# Patient Record
Sex: MALE | Race: WHITE | NOT HISPANIC OR LATINO | Employment: OTHER | URBAN - METROPOLITAN AREA
[De-identification: names, ages, dates, MRNs, and addresses within clinical notes are randomized per-mention and may not be internally consistent; named-entity substitution may affect disease eponyms.]

---

## 2017-03-15 ENCOUNTER — HISTORICAL (OUTPATIENT)
Dept: LAB | Facility: HOSPITAL | Age: 67
End: 2017-03-15

## 2017-03-15 LAB
ABS NEUT (OLG): 1.7
ALBUMIN SERPL-MCNC: 3.8 GM/DL (ref 3.4–5)
ALBUMIN/GLOB SERPL: 1 RATIO (ref 1.1–2)
ALP SERPL-CCNC: 95 UNIT/L (ref 50–136)
ALT SERPL-CCNC: 29 UNIT/L (ref 12–78)
APTT PPP: 24.4 SECOND(S) (ref 23–32)
AST SERPL-CCNC: 20 UNIT/L (ref 10–37)
BILIRUB SERPL-MCNC: 0.5 MG/DL (ref 0.2–1)
BILIRUBIN DIRECT+TOT PNL SERPL-MCNC: 0.15 MG/DL (ref 0.05–0.2)
BILIRUBIN DIRECT+TOT PNL SERPL-MCNC: 0.35 MG/DL
BUN SERPL-MCNC: 15 MG/DL (ref 7–18)
CALCIUM SERPL-MCNC: 9.2 MG/DL (ref 8.5–10.1)
CHLORIDE SERPL-SCNC: 104 MMOL/L (ref 98–107)
CO2 SERPL-SCNC: 34.1 MMOL/L (ref 21–32)
CREAT SERPL-MCNC: 0.7 MG/DL (ref 0.7–1.3)
EOSINOPHIL NFR BLD MANUAL: 8 % (ref 0–8)
ERYTHROCYTE [DISTWIDTH] IN BLOOD BY AUTOMATED COUNT: 12 %
GLOBULIN SER-MCNC: 3.8 GM/DL (ref 2.4–3.5)
GLUCOSE SERPL-MCNC: 101 MG/DL (ref 74–106)
GRANULOCYTES NFR BLD MANUAL: 36 % (ref 47–80)
HCT VFR BLD AUTO: 41.3 % (ref 39–49)
HGB BLD-MCNC: 14 GM/DL (ref 12.6–16.6)
INR PPP: 0.98
LYMPHOCYTES NFR BLD MANUAL: 42 % (ref 13–40)
MCH RBC QN AUTO: 33.6 PG (ref 27–33)
MCHC RBC AUTO-ENTMCNC: 33.9 GM/DL (ref 32–35)
MCV RBC AUTO: 99 FL (ref 84–97)
MONOCYTES NFR BLD MANUAL: 14 % (ref 2–11)
PLATELET # BLD AUTO: 206 X10(3)/MCL (ref 151–368)
PLATELET # BLD EST: ADEQUATE 10*3/UL
PMV BLD AUTO: 10 FL
POTASSIUM SERPL-SCNC: 3.9 MMOL/L (ref 3.5–5.1)
PROT SERPL-MCNC: 7.6 GM/DL (ref 6.4–8.2)
PROTHROMBIN TIME: 9.9 SECOND(S) (ref 9–11.5)
RBC # BLD AUTO: 4.17 X10(6)/MCL (ref 4.3–5.6)
SODIUM SERPL-SCNC: 142 MMOL/L (ref 136–145)
WBC # SPEC AUTO: 4.34 X10(3)/MCL (ref 3.4–9.2)

## 2017-03-22 ENCOUNTER — HISTORICAL (OUTPATIENT)
Dept: MEDSURG UNIT | Facility: HOSPITAL | Age: 67
End: 2017-03-22

## 2017-03-22 LAB — CRC RECOMMENDATION EXT: NORMAL

## 2017-11-08 ENCOUNTER — HISTORICAL (OUTPATIENT)
Dept: LAB | Facility: HOSPITAL | Age: 67
End: 2017-11-08

## 2017-11-08 LAB
ABS NEUT (OLG): 2.32
BASOPHILS # BLD AUTO: 0.01 X10(3)/MCL
BASOPHILS NFR BLD AUTO: 0.2 %
BUN SERPL-MCNC: 22 MG/DL (ref 7–18)
CALCIUM SERPL-MCNC: 8.9 MG/DL (ref 8.5–10.1)
CHLORIDE SERPL-SCNC: 105 MMOL/L (ref 98–107)
CO2 SERPL-SCNC: 29.3 MMOL/L (ref 21–32)
CREAT SERPL-MCNC: 0.83 MG/DL (ref 0.7–1.3)
EOSINOPHIL # BLD AUTO: 0.15 X10(3)/MCL
EOSINOPHIL NFR BLD AUTO: 2.9 %
ERYTHROCYTE [DISTWIDTH] IN BLOOD BY AUTOMATED COUNT: 12 %
GLUCOSE SERPL-MCNC: 72 MG/DL (ref 74–106)
HCT VFR BLD AUTO: 39.9 % (ref 39–49)
HGB BLD-MCNC: 13.7 GM/DL (ref 12.6–16.6)
IMM GRANULOCYTES # BLD AUTO: 0.01 10*3/UL (ref 0–0.1)
IMM GRANULOCYTES NFR BLD AUTO: 0.2 % (ref 0–1)
LYMPHOCYTES # BLD AUTO: 2.06 X10(3)/MCL
LYMPHOCYTES NFR BLD AUTO: 39.2 %
MCH RBC QN AUTO: 33.8 PG (ref 27–33)
MCHC RBC AUTO-ENTMCNC: 34.3 GM/DL (ref 32–35)
MCV RBC AUTO: 98.5 FL (ref 84–97)
MONOCYTES # BLD AUTO: 0.7 X10(3)/MCL
MONOCYTES NFR BLD AUTO: 13.3 %
NEUTROPHILS # BLD AUTO: 2.32 X10(3)/MCL
NEUTROPHILS NFR BLD AUTO: 44.2 %
PLATELET # BLD AUTO: 204 X10(3)/MCL (ref 151–368)
PMV BLD AUTO: 10 FL
POTASSIUM SERPL-SCNC: 4.5 MMOL/L (ref 3.5–5.1)
RBC # BLD AUTO: 4.05 X10(6)/MCL (ref 4.3–5.6)
SODIUM SERPL-SCNC: 140 MMOL/L (ref 136–145)
TSH SERPL-ACNC: 0.8 MIU/ML (ref 0.35–3.75)
WBC # SPEC AUTO: 5.25 X10(3)/MCL (ref 3.4–9.2)

## 2017-11-17 ENCOUNTER — HISTORICAL (OUTPATIENT)
Dept: RESPIRATORY THERAPY | Facility: HOSPITAL | Age: 67
End: 2017-11-17

## 2017-12-20 ENCOUNTER — HISTORICAL (OUTPATIENT)
Dept: RADIOLOGY | Facility: HOSPITAL | Age: 67
End: 2017-12-20

## 2018-03-23 ENCOUNTER — HISTORICAL (OUTPATIENT)
Dept: LAB | Facility: HOSPITAL | Age: 68
End: 2018-03-23

## 2018-03-23 LAB
ABS NEUT (OLG): 1.32
ALBUMIN SERPL-MCNC: 3.7 GM/DL (ref 3.4–5)
ALBUMIN/GLOB SERPL: 1 RATIO (ref 1.1–2)
ALP SERPL-CCNC: 82 UNIT/L (ref 46–116)
ALT SERPL-CCNC: 33 UNIT/L (ref 12–78)
AST SERPL-CCNC: 22 UNIT/L (ref 10–37)
BILIRUB SERPL-MCNC: 0.5 MG/DL (ref 0.2–1)
BILIRUBIN DIRECT+TOT PNL SERPL-MCNC: 0.13 MG/DL (ref 0–0.2)
BILIRUBIN DIRECT+TOT PNL SERPL-MCNC: 0.37 MG/DL
BUN SERPL-MCNC: 14 MG/DL (ref 7–18)
CALCIUM SERPL-MCNC: 9.2 MG/DL (ref 8.5–10.1)
CHLORIDE SERPL-SCNC: 105 MMOL/L (ref 98–107)
CHOLEST SERPL-MCNC: 204 MG/DL (ref 50–200)
CHOLEST/HDLC SERPL: 3 {RATIO} (ref 0–5)
CO2 SERPL-SCNC: 30.9 MMOL/L (ref 21–32)
CREAT SERPL-MCNC: 0.82 MG/DL (ref 0.7–1.3)
ERYTHROCYTE [DISTWIDTH] IN BLOOD BY AUTOMATED COUNT: 13 %
GLOBULIN SER-MCNC: 3.6 GM/DL (ref 2.4–3.5)
GLUCOSE SERPL-MCNC: 97 MG/DL (ref 74–106)
GRANULOCYTES NFR BLD MANUAL: 43 % (ref 47–80)
HCT VFR BLD AUTO: 40 % (ref 39–49)
HDLC SERPL-MCNC: 70 MG/DL (ref 35–60)
HGB BLD-MCNC: 13.8 GM/DL (ref 12.6–16.6)
LDLC SERPL CALC-MCNC: 121 MG/DL (ref 50–140)
LYMPHOCYTES NFR BLD MANUAL: 43 % (ref 13–40)
MCH RBC QN AUTO: 34.4 PG (ref 27–33)
MCHC RBC AUTO-ENTMCNC: 34.5 GM/DL (ref 32–35)
MCV RBC AUTO: 99.8 FL (ref 84–97)
MONOCYTES NFR BLD MANUAL: 14 % (ref 2–11)
PLATELET # BLD AUTO: 186 X10(3)/MCL (ref 151–368)
PLATELET # BLD EST: ADEQUATE 10*3/UL
PMV BLD AUTO: 10 FL
POTASSIUM SERPL-SCNC: 3.8 MMOL/L (ref 3.5–5.1)
PROT SERPL-MCNC: 7.3 GM/DL (ref 6.4–8.2)
PSA SERPL-MCNC: 2.2 NG/ML (ref 0–4)
RBC # BLD AUTO: 4.01 X10(6)/MCL (ref 4.3–5.6)
SODIUM SERPL-SCNC: 143 MMOL/L (ref 136–145)
TRIGL SERPL-MCNC: 63 MG/DL (ref 30–150)
VLDLC SERPL CALC-MCNC: 13 MG/DL
WBC # SPEC AUTO: 3.46 X10(3)/MCL (ref 3.4–9.2)

## 2018-08-22 ENCOUNTER — HISTORICAL (OUTPATIENT)
Dept: RADIOLOGY | Facility: HOSPITAL | Age: 68
End: 2018-08-22

## 2019-01-07 ENCOUNTER — HISTORICAL (OUTPATIENT)
Dept: LAB | Facility: HOSPITAL | Age: 69
End: 2019-01-07

## 2019-01-07 LAB
ABS NEUT (OLG): 1.81
BASOPHILS # BLD AUTO: 0.01 X10(3)/MCL
BASOPHILS NFR BLD AUTO: 0.2 %
BUN SERPL-MCNC: 18 MG/DL (ref 7–18)
CALCIUM SERPL-MCNC: 9.1 MG/DL (ref 8.5–10.1)
CHLORIDE SERPL-SCNC: 104 MMOL/L (ref 98–107)
CO2 SERPL-SCNC: 32.2 MMOL/L (ref 21–32)
CREAT SERPL-MCNC: 0.93 MG/DL (ref 0.7–1.3)
CREAT/UREA NIT SERPL: 19
EOSINOPHIL # BLD AUTO: 0.14 X10(3)/MCL
EOSINOPHIL NFR BLD AUTO: 3.3 %
ERYTHROCYTE [DISTWIDTH] IN BLOOD BY AUTOMATED COUNT: 12 %
GLUCOSE SERPL-MCNC: 103 MG/DL (ref 74–106)
HCT VFR BLD AUTO: 43.7 % (ref 39–49)
HGB BLD-MCNC: 15 GM/DL (ref 12.6–16.6)
IMM GRANULOCYTES # BLD AUTO: 0.01 10*3/UL (ref 0–0.1)
IMM GRANULOCYTES NFR BLD AUTO: 0.2 % (ref 0–1)
LYMPHOCYTES # BLD AUTO: 1.67 X10(3)/MCL
LYMPHOCYTES NFR BLD AUTO: 39 %
MCH RBC QN AUTO: 34.3 PG (ref 27–33)
MCHC RBC AUTO-ENTMCNC: 34.3 GM/DL (ref 32–35)
MCV RBC AUTO: 100 FL (ref 84–97)
MONOCYTES # BLD AUTO: 0.64 X10(3)/MCL
MONOCYTES NFR BLD AUTO: 15 %
NEUTROPHILS # BLD AUTO: 1.81 X10(3)/MCL
NEUTROPHILS NFR BLD AUTO: 42.3 %
PLATELET # BLD AUTO: 168 X10(3)/MCL (ref 151–368)
PMV BLD AUTO: 11 FL
POTASSIUM SERPL-SCNC: 4.2 MMOL/L (ref 3.5–5.1)
RBC # BLD AUTO: 4.37 X10(6)/MCL (ref 4.3–5.6)
SODIUM SERPL-SCNC: 143 MMOL/L (ref 136–145)
WBC # SPEC AUTO: 4.28 X10(3)/MCL (ref 3.4–9.2)

## 2019-03-23 ENCOUNTER — HISTORICAL (OUTPATIENT)
Dept: LAB | Facility: HOSPITAL | Age: 69
End: 2019-03-23

## 2019-03-23 LAB
ABS NEUT (OLG): 1.92
ALBUMIN SERPL-MCNC: 3.7 GM/DL (ref 3.4–5)
ALBUMIN/GLOB SERPL: 1.1 RATIO (ref 1.1–2)
ALP SERPL-CCNC: 89 UNIT/L (ref 46–116)
ALT SERPL-CCNC: 28 UNIT/L (ref 12–78)
AST SERPL-CCNC: 20 UNIT/L (ref 10–37)
BASOPHILS # BLD AUTO: 0.01 X10(3)/MCL
BASOPHILS NFR BLD AUTO: 0.2 %
BILIRUB SERPL-MCNC: 0.3 MG/DL (ref 0.2–1)
BILIRUBIN DIRECT+TOT PNL SERPL-MCNC: 0.08 MG/DL (ref 0–0.2)
BILIRUBIN DIRECT+TOT PNL SERPL-MCNC: 0.22 MG/DL
BUN SERPL-MCNC: 18 MG/DL (ref 7–18)
CALCIUM SERPL-MCNC: 8.7 MG/DL (ref 8.5–10.1)
CHLORIDE SERPL-SCNC: 103 MMOL/L (ref 98–107)
CHOLEST SERPL-MCNC: 200 MG/DL (ref 50–200)
CHOLEST/HDLC SERPL: 5 {RATIO} (ref 0–5)
CO2 SERPL-SCNC: 31.9 MMOL/L (ref 21–32)
CREAT SERPL-MCNC: 0.91 MG/DL (ref 0.7–1.3)
EOSINOPHIL # BLD AUTO: 0.16 X10(3)/MCL
EOSINOPHIL NFR BLD AUTO: 3.6 %
ERYTHROCYTE [DISTWIDTH] IN BLOOD BY AUTOMATED COUNT: 12 %
GLOBULIN SER-MCNC: 3.5 GM/DL (ref 2.4–3.5)
GLUCOSE SERPL-MCNC: 95 MG/DL (ref 74–106)
HCT VFR BLD AUTO: 40.8 % (ref 39–49)
HDLC SERPL-MCNC: 41 MG/DL (ref 35–60)
HGB BLD-MCNC: 13.8 GM/DL (ref 12.6–16.6)
IMM GRANULOCYTES # BLD AUTO: 0.01 10*3/UL (ref 0–0.1)
IMM GRANULOCYTES NFR BLD AUTO: 0.2 % (ref 0–1)
LDLC SERPL CALC-MCNC: 136 MG/DL (ref 50–140)
LYMPHOCYTES # BLD AUTO: 1.9 X10(3)/MCL
LYMPHOCYTES NFR BLD AUTO: 42.3 %
MCH RBC QN AUTO: 33.8 PG (ref 27–33)
MCHC RBC AUTO-ENTMCNC: 33.8 GM/DL (ref 32–35)
MCV RBC AUTO: 100 FL (ref 84–97)
MONOCYTES # BLD AUTO: 0.49 X10(3)/MCL
MONOCYTES NFR BLD AUTO: 10.9 %
NEUTROPHILS # BLD AUTO: 1.92 X10(3)/MCL
NEUTROPHILS NFR BLD AUTO: 42.8 %
PLATELET # BLD AUTO: 168 X10(3)/MCL (ref 151–368)
PMV BLD AUTO: 10 FL
POTASSIUM SERPL-SCNC: 4 MMOL/L (ref 3.5–5.1)
PROT SERPL-MCNC: 7.2 GM/DL (ref 6.4–8.2)
PSA SERPL-MCNC: 1.76 NG/ML (ref 0–4)
RBC # BLD AUTO: 4.08 X10(6)/MCL (ref 4.3–5.6)
SODIUM SERPL-SCNC: 141 MMOL/L (ref 136–145)
TRIGL SERPL-MCNC: 115 MG/DL (ref 30–150)
VLDLC SERPL CALC-MCNC: 23 MG/DL
WBC # SPEC AUTO: 4.49 X10(3)/MCL (ref 3.4–9.2)

## 2019-05-21 ENCOUNTER — HISTORICAL (OUTPATIENT)
Dept: ADMINISTRATIVE | Facility: HOSPITAL | Age: 69
End: 2019-05-21

## 2019-05-31 ENCOUNTER — HISTORICAL (OUTPATIENT)
Dept: LAB | Facility: HOSPITAL | Age: 69
End: 2019-05-31

## 2019-05-31 LAB
BUN SERPL-MCNC: 14 MG/DL (ref 7–18)
CALCIUM SERPL-MCNC: 9.1 MG/DL (ref 8.5–10.1)
CHLORIDE SERPL-SCNC: 104 MMOL/L (ref 98–107)
CO2 SERPL-SCNC: 26 MMOL/L (ref 21–32)
CREAT SERPL-MCNC: 0.92 MG/DL (ref 0.7–1.3)
CREAT/UREA NIT SERPL: 15
GLUCOSE SERPL-MCNC: 107 MG/DL (ref 74–106)
MAGNESIUM SERPL-MCNC: 1.8 MG/DL (ref 1.8–2.4)
POTASSIUM SERPL-SCNC: 3.8 MMOL/L (ref 3.5–5.1)
SODIUM SERPL-SCNC: 138 MMOL/L (ref 136–145)

## 2019-06-12 ENCOUNTER — HISTORICAL (OUTPATIENT)
Dept: LAB | Facility: HOSPITAL | Age: 69
End: 2019-06-12

## 2019-06-12 LAB
BUN SERPL-MCNC: 15 MG/DL (ref 7–18)
CALCIUM SERPL-MCNC: 9.5 MG/DL (ref 8.5–10.1)
CHLORIDE SERPL-SCNC: 101 MMOL/L (ref 98–107)
CO2 SERPL-SCNC: 31.2 MMOL/L (ref 21–32)
CREAT SERPL-MCNC: 0.9 MG/DL (ref 0.7–1.3)
CREAT/UREA NIT SERPL: 17
DIGOXIN SERPL-MCNC: 0.84 NG/ML (ref 0.9–2)
GLUCOSE SERPL-MCNC: 103 MG/DL (ref 74–106)
POTASSIUM SERPL-SCNC: 4.1 MMOL/L (ref 3.5–5.1)
SODIUM SERPL-SCNC: 140 MMOL/L (ref 136–145)

## 2019-06-18 ENCOUNTER — HISTORICAL (OUTPATIENT)
Dept: LAB | Facility: HOSPITAL | Age: 69
End: 2019-06-18

## 2019-06-18 LAB
BUN SERPL-MCNC: 14 MG/DL (ref 7–18)
CALCIUM SERPL-MCNC: 9.1 MG/DL (ref 8.5–10.1)
CHLORIDE SERPL-SCNC: 100 MMOL/L (ref 98–107)
CO2 SERPL-SCNC: 27.9 MMOL/L (ref 21–32)
CREAT SERPL-MCNC: 0.91 MG/DL (ref 0.7–1.3)
CREAT/UREA NIT SERPL: 15
DIGOXIN SERPL-MCNC: 0.69 NG/ML (ref 0.9–2)
GLUCOSE SERPL-MCNC: 132 MG/DL (ref 74–106)
POTASSIUM SERPL-SCNC: 4.2 MMOL/L (ref 3.5–5.1)
SODIUM SERPL-SCNC: 136 MMOL/L (ref 136–145)

## 2019-07-03 ENCOUNTER — HISTORICAL (OUTPATIENT)
Dept: LAB | Facility: HOSPITAL | Age: 69
End: 2019-07-03

## 2019-07-03 LAB
ABS NEUT (OLG): 2.39
BUN SERPL-MCNC: 10 MG/DL (ref 7–18)
CALCIUM SERPL-MCNC: 9.3 MG/DL (ref 8.5–10.1)
CHLORIDE SERPL-SCNC: 103 MMOL/L (ref 98–107)
CO2 SERPL-SCNC: 32.5 MMOL/L (ref 21–32)
CREAT SERPL-MCNC: 0.98 MG/DL (ref 0.7–1.3)
CREAT/UREA NIT SERPL: 10
DIGOXIN SERPL-MCNC: 1.43 NG/ML (ref 0.9–2)
EOSINOPHIL NFR BLD MANUAL: 4 % (ref 0–8)
ERYTHROCYTE [DISTWIDTH] IN BLOOD BY AUTOMATED COUNT: 14 %
GLUCOSE SERPL-MCNC: 95 MG/DL (ref 74–106)
GRANULOCYTES NFR BLD MANUAL: 39 % (ref 47–80)
HCT VFR BLD AUTO: 41 % (ref 39–49)
HGB BLD-MCNC: 13 GM/DL (ref 12.6–16.6)
LYMPHOCYTES NFR BLD MANUAL: 41 % (ref 13–40)
MCH RBC QN AUTO: 30.7 PG (ref 27–33)
MCHC RBC AUTO-ENTMCNC: 31.7 GM/DL (ref 32–35)
MCV RBC AUTO: 96.7 FL (ref 84–97)
MONOCYTES NFR BLD MANUAL: 16 % (ref 2–11)
PLATELET # BLD AUTO: 256 X10(3)/MCL (ref 140–450)
PLATELET # BLD EST: ADEQUATE 10*3/UL
PMV BLD AUTO: 10 FL
POTASSIUM SERPL-SCNC: 4.5 MMOL/L (ref 3.5–5.1)
RBC # BLD AUTO: 4.24 X10(6)/MCL (ref 4.3–5.6)
SODIUM SERPL-SCNC: 140 MMOL/L (ref 136–145)
WBC # SPEC AUTO: 5.77 X10(3)/MCL (ref 3.4–9.2)

## 2019-07-23 ENCOUNTER — HISTORICAL (OUTPATIENT)
Dept: LAB | Facility: HOSPITAL | Age: 69
End: 2019-07-23

## 2019-07-23 LAB
ABS NEUT (OLG): 1.96
BUN SERPL-MCNC: 18 MG/DL (ref 7–18)
CALCIUM SERPL-MCNC: 9.5 MG/DL (ref 8.5–10.1)
CHLORIDE SERPL-SCNC: 101 MMOL/L (ref 98–107)
CO2 SERPL-SCNC: 32.7 MMOL/L (ref 21–32)
CREAT SERPL-MCNC: 0.97 MG/DL (ref 0.7–1.3)
CREAT/UREA NIT SERPL: 19
DIGOXIN SERPL-MCNC: 1.64 NG/ML (ref 0.9–2)
EOSINOPHIL NFR BLD MANUAL: 3 % (ref 0–8)
ERYTHROCYTE [DISTWIDTH] IN BLOOD BY AUTOMATED COUNT: 14 %
GLUCOSE SERPL-MCNC: 81 MG/DL (ref 74–106)
GRANULOCYTES NFR BLD MANUAL: 39 % (ref 47–80)
HCT VFR BLD AUTO: 39.7 % (ref 39–49)
HGB BLD-MCNC: 12.3 GM/DL (ref 12.6–16.6)
LYMPHOCYTES NFR BLD MANUAL: 42 % (ref 13–40)
MACROCYTES BLD QL SMEAR: SLIGHT
MCH RBC QN AUTO: 30.2 PG (ref 27–33)
MCHC RBC AUTO-ENTMCNC: 31 GM/DL (ref 32–35)
MCV RBC AUTO: 97.5 FL (ref 84–97)
MONOCYTES NFR BLD MANUAL: 16 % (ref 2–11)
PLATELET # BLD AUTO: 190 X10(3)/MCL (ref 140–450)
PLATELET # BLD EST: ADEQUATE 10*3/UL
PMV BLD AUTO: 10 FL
POTASSIUM SERPL-SCNC: 5.5 MMOL/L (ref 3.5–5.1)
RBC # BLD AUTO: 4.07 X10(6)/MCL (ref 4.3–5.6)
SODIUM SERPL-SCNC: 140 MMOL/L (ref 136–145)
WBC # SPEC AUTO: 5.1 X10(3)/MCL (ref 3.4–9.2)

## 2019-08-03 ENCOUNTER — HISTORICAL (OUTPATIENT)
Dept: LAB | Facility: HOSPITAL | Age: 69
End: 2019-08-03

## 2019-08-03 LAB
BUN SERPL-MCNC: 16 MG/DL (ref 7–18)
CALCIUM SERPL-MCNC: 9.4 MG/DL (ref 8.5–10.1)
CHLORIDE SERPL-SCNC: 105 MMOL/L (ref 98–107)
CO2 SERPL-SCNC: 30.7 MMOL/L (ref 21–32)
CREAT SERPL-MCNC: 1.01 MG/DL (ref 0.7–1.3)
CREAT/UREA NIT SERPL: 16
GLUCOSE SERPL-MCNC: 118 MG/DL (ref 74–106)
POTASSIUM SERPL-SCNC: 5 MMOL/L (ref 3.5–5.1)
SODIUM SERPL-SCNC: 142 MMOL/L (ref 136–145)

## 2019-08-14 ENCOUNTER — HISTORICAL (OUTPATIENT)
Dept: LAB | Facility: HOSPITAL | Age: 69
End: 2019-08-14

## 2019-08-14 LAB
BUN SERPL-MCNC: 20 MG/DL (ref 7–18)
CALCIUM SERPL-MCNC: 9.6 MG/DL (ref 8.5–10.1)
CHLORIDE SERPL-SCNC: 102 MMOL/L (ref 98–107)
CO2 SERPL-SCNC: 32.2 MMOL/L (ref 21–32)
CREAT SERPL-MCNC: 1.42 MG/DL (ref 0.7–1.3)
CREAT/UREA NIT SERPL: 14
DIGOXIN SERPL-MCNC: 0.66 NG/ML (ref 0.9–2)
GLUCOSE SERPL-MCNC: 109 MG/DL (ref 74–106)
POTASSIUM SERPL-SCNC: 4.7 MMOL/L (ref 3.5–5.1)
SODIUM SERPL-SCNC: 141 MMOL/L (ref 136–145)

## 2019-08-27 ENCOUNTER — HISTORICAL (OUTPATIENT)
Dept: LAB | Facility: HOSPITAL | Age: 69
End: 2019-08-27

## 2019-08-27 LAB
BUN SERPL-MCNC: 20 MG/DL (ref 7–18)
CALCIUM SERPL-MCNC: 9.4 MG/DL (ref 8.5–10.1)
CHLORIDE SERPL-SCNC: 107 MMOL/L (ref 98–107)
CO2 SERPL-SCNC: 31.3 MMOL/L (ref 21–32)
CREAT SERPL-MCNC: 1.13 MG/DL (ref 0.7–1.3)
CREAT/UREA NIT SERPL: 18
GLUCOSE SERPL-MCNC: 98 MG/DL (ref 74–106)
POTASSIUM SERPL-SCNC: 5 MMOL/L (ref 3.5–5.1)
SODIUM SERPL-SCNC: 144 MMOL/L (ref 136–145)

## 2019-10-08 ENCOUNTER — HISTORICAL (OUTPATIENT)
Dept: LAB | Facility: HOSPITAL | Age: 69
End: 2019-10-08

## 2019-10-08 LAB
BUN SERPL-MCNC: 19 MG/DL (ref 7–18)
CALCIUM SERPL-MCNC: 9.4 MG/DL (ref 8.5–10.1)
CHLORIDE SERPL-SCNC: 105 MMOL/L (ref 98–107)
CO2 SERPL-SCNC: 30.7 MMOL/L (ref 21–32)
CREAT SERPL-MCNC: 1.13 MG/DL (ref 0.7–1.3)
CREAT/UREA NIT SERPL: 17
DIGOXIN SERPL-MCNC: 0.56 NG/ML (ref 0.9–2)
GLUCOSE SERPL-MCNC: 118 MG/DL (ref 74–106)
POTASSIUM SERPL-SCNC: 4.5 MMOL/L (ref 3.5–5.1)
SODIUM SERPL-SCNC: 142 MMOL/L (ref 136–145)

## 2019-11-07 ENCOUNTER — HISTORICAL (OUTPATIENT)
Dept: LAB | Facility: HOSPITAL | Age: 69
End: 2019-11-07

## 2019-11-07 LAB
BUN SERPL-MCNC: 21 MG/DL (ref 7–18)
CALCIUM SERPL-MCNC: 8.9 MG/DL (ref 8.5–10.1)
CHLORIDE SERPL-SCNC: 103 MMOL/L (ref 98–107)
CO2 SERPL-SCNC: 31.8 MMOL/L (ref 21–32)
CREAT SERPL-MCNC: 0.95 MG/DL (ref 0.7–1.3)
CREAT/UREA NIT SERPL: 22
DIGOXIN SERPL-MCNC: 0.36 NG/ML (ref 0.9–2)
GLUCOSE SERPL-MCNC: 104 MG/DL (ref 74–106)
POTASSIUM SERPL-SCNC: 4.4 MMOL/L (ref 3.5–5.1)
SODIUM SERPL-SCNC: 140 MMOL/L (ref 136–145)

## 2019-12-24 ENCOUNTER — HISTORICAL (OUTPATIENT)
Dept: LAB | Facility: HOSPITAL | Age: 69
End: 2019-12-24

## 2019-12-24 LAB
BUN SERPL-MCNC: 17 MG/DL (ref 8.4–25.7)
CALCIUM SERPL-MCNC: 9.5 MG/DL (ref 8.8–10)
CHLORIDE SERPL-SCNC: 104 MMOL/L (ref 98–107)
CO2 SERPL-SCNC: 30 MEQ/L (ref 23–31)
CREAT SERPL-MCNC: 0.95 MG/DL (ref 0.73–1.18)
DIGOXIN SERPL-MCNC: 0.23 NG/ML (ref 0.8–2)
GLUCOSE SERPL-MCNC: 107 MG/DL (ref 82–115)
POTASSIUM SERPL-SCNC: 5 MMOL/L (ref 3.5–5.1)
SODIUM SERPL-SCNC: 142 MMOL/L (ref 136–145)

## 2020-01-31 ENCOUNTER — HISTORICAL (OUTPATIENT)
Dept: LAB | Facility: HOSPITAL | Age: 70
End: 2020-01-31

## 2020-01-31 LAB — POTASSIUM SERPL-SCNC: 4.8 MMOL/L (ref 3.5–5.1)

## 2020-04-08 ENCOUNTER — HISTORICAL (OUTPATIENT)
Dept: LAB | Facility: HOSPITAL | Age: 70
End: 2020-04-08

## 2020-04-08 LAB — POTASSIUM SERPL-SCNC: 5 MMOL/L (ref 3.5–5.1)

## 2020-04-14 ENCOUNTER — HISTORICAL (OUTPATIENT)
Dept: LAB | Facility: HOSPITAL | Age: 70
End: 2020-04-14

## 2020-04-14 LAB
ALBUMIN SERPL-MCNC: 3.9 GM/DL (ref 3.4–4.8)
ALP SERPL-CCNC: 82 UNIT/L (ref 40–150)
ALT SERPL-CCNC: 19 UNIT/L (ref 0–55)
AST SERPL-CCNC: 23 UNIT/L (ref 5–34)
BILIRUB SERPL-MCNC: 0.6 MG/DL
BILIRUBIN DIRECT+TOT PNL SERPL-MCNC: 0.2 MG/DL (ref 0–0.5)
BILIRUBIN DIRECT+TOT PNL SERPL-MCNC: 0.4 MG/DL
BUN SERPL-MCNC: 20 MG/DL (ref 8.4–25.7)
CALCIUM SERPL-MCNC: 9.8 MG/DL (ref 8.8–10)
CHLORIDE SERPL-SCNC: 106 MMOL/L (ref 98–107)
CHOLEST SERPL-MCNC: 237 MG/DL
CHOLEST/HDLC SERPL: 4 {RATIO} (ref 0–5)
CO2 SERPL-SCNC: 32 MEQ/L (ref 23–31)
CREAT SERPL-MCNC: 0.97 MG/DL (ref 0.73–1.18)
CREAT/UREA NIT SERPL: 21
GLUCOSE SERPL-MCNC: 99 MG/DL (ref 82–115)
HDLC SERPL-MCNC: 55 MG/DL (ref 35–60)
LDLC SERPL CALC-MCNC: 159 MG/DL (ref 50–140)
POTASSIUM SERPL-SCNC: 5.7 MMOL/L (ref 3.5–5.1)
PROT SERPL-MCNC: 7.2 GM/DL (ref 5.8–7.6)
SODIUM SERPL-SCNC: 146 MMOL/L (ref 136–145)
TRIGL SERPL-MCNC: 117 MG/DL (ref 34–140)
VLDLC SERPL CALC-MCNC: 23 MG/DL

## 2020-09-16 ENCOUNTER — HISTORICAL (OUTPATIENT)
Dept: LAB | Facility: HOSPITAL | Age: 70
End: 2020-09-16

## 2020-09-16 LAB
ALBUMIN SERPL-MCNC: 3.8 GM/DL (ref 3.4–4.8)
ALP SERPL-CCNC: 82 UNIT/L (ref 40–150)
ALT SERPL-CCNC: 26 UNIT/L (ref 0–55)
AST SERPL-CCNC: 29 UNIT/L (ref 5–34)
BILIRUB SERPL-MCNC: 0.4 MG/DL
BILIRUBIN DIRECT+TOT PNL SERPL-MCNC: 0.2 MG/DL
BILIRUBIN DIRECT+TOT PNL SERPL-MCNC: 0.2 MG/DL (ref 0–0.5)
BUN SERPL-MCNC: 17 MG/DL (ref 8.4–25.7)
CALCIUM SERPL-MCNC: 9.3 MG/DL (ref 8.8–10)
CHLORIDE SERPL-SCNC: 107 MMOL/L (ref 98–107)
CHOLEST SERPL-MCNC: 204 MG/DL
CHOLEST/HDLC SERPL: 3 {RATIO} (ref 0–5)
CO2 SERPL-SCNC: 30 MEQ/L (ref 23–31)
CREAT SERPL-MCNC: 1.14 MG/DL (ref 0.73–1.18)
CREAT/UREA NIT SERPL: 15
GLUCOSE SERPL-MCNC: 98 MG/DL (ref 82–115)
HDLC SERPL-MCNC: 60 MG/DL (ref 35–60)
LDLC SERPL CALC-MCNC: 124 MG/DL (ref 50–140)
POTASSIUM SERPL-SCNC: 5.5 MMOL/L (ref 3.5–5.1)
PROT SERPL-MCNC: 7.3 GM/DL (ref 5.8–7.6)
SODIUM SERPL-SCNC: 146 MMOL/L (ref 136–145)
TRIGL SERPL-MCNC: 101 MG/DL (ref 34–140)
VLDLC SERPL CALC-MCNC: 20 MG/DL

## 2020-10-20 ENCOUNTER — HISTORICAL (OUTPATIENT)
Dept: LAB | Facility: HOSPITAL | Age: 70
End: 2020-10-20

## 2020-10-20 LAB
ABS NEUT (OLG): 2.04
ALBUMIN SERPL-MCNC: 3.7 GM/DL (ref 3.4–4.8)
ALBUMIN/GLOB SERPL: 1.1 RATIO (ref 1.1–2)
ALP SERPL-CCNC: 89 UNIT/L (ref 40–150)
ALT SERPL-CCNC: 22 UNIT/L (ref 0–55)
AST SERPL-CCNC: 25 UNIT/L (ref 5–34)
BASOPHILS # BLD AUTO: 0.01 X10(3)/MCL
BASOPHILS NFR BLD AUTO: 0.3 %
BILIRUB SERPL-MCNC: 0.5 MG/DL
BILIRUBIN DIRECT+TOT PNL SERPL-MCNC: 0.2 MG/DL (ref 0–0.5)
BILIRUBIN DIRECT+TOT PNL SERPL-MCNC: 0.3 MG/DL
BUN SERPL-MCNC: 18 MG/DL (ref 8.4–25.7)
CALCIUM SERPL-MCNC: 9.9 MG/DL (ref 8.8–10)
CHLORIDE SERPL-SCNC: 105 MMOL/L (ref 98–107)
CHOLEST SERPL-MCNC: 184 MG/DL
CHOLEST/HDLC SERPL: 3 {RATIO} (ref 0–5)
CO2 SERPL-SCNC: 31 MEQ/L (ref 23–31)
CREAT SERPL-MCNC: 0.89 MG/DL (ref 0.73–1.18)
EOSINOPHIL # BLD AUTO: 0.05 X10(3)/MCL
EOSINOPHIL NFR BLD AUTO: 1.3 %
ERYTHROCYTE [DISTWIDTH] IN BLOOD BY AUTOMATED COUNT: 13 %
GLOBULIN SER-MCNC: 3.3 GM/DL (ref 2.4–3.5)
GLUCOSE SERPL-MCNC: 97 MG/DL (ref 82–115)
HCT VFR BLD AUTO: 39.8 % (ref 39–49)
HDLC SERPL-MCNC: 62 MG/DL (ref 35–60)
HGB BLD-MCNC: 12.7 GM/DL (ref 12.6–16.6)
IMM GRANULOCYTES # BLD AUTO: 0.01 10*3/UL (ref 0–0.1)
IMM GRANULOCYTES NFR BLD AUTO: 0.3 % (ref 0–1)
LDLC SERPL CALC-MCNC: 103 MG/DL (ref 50–140)
LYMPHOCYTES # BLD AUTO: 1.28 X10(3)/MCL
LYMPHOCYTES NFR BLD AUTO: 33.2 %
MCH RBC QN AUTO: 32.6 PG (ref 27–33)
MCHC RBC AUTO-ENTMCNC: 31.9 GM/DL (ref 32–35)
MCV RBC AUTO: 102.3 FL (ref 84–97)
MONOCYTES # BLD AUTO: 0.46 X10(3)/MCL
MONOCYTES NFR BLD AUTO: 11.9 %
NEUTROPHILS # BLD AUTO: 2.04 X10(3)/MCL
NEUTROPHILS NFR BLD AUTO: 53 %
PLATELET # BLD AUTO: 215 X10(3)/MCL (ref 140–450)
PMV BLD AUTO: 10 FL
POTASSIUM SERPL-SCNC: 5.3 MMOL/L (ref 3.5–5.1)
PROT SERPL-MCNC: 7 GM/DL (ref 5.8–7.6)
PSA SERPL-MCNC: 1.22 NG/ML
RBC # BLD AUTO: 3.89 X10(6)/MCL (ref 4.3–5.6)
SODIUM SERPL-SCNC: 143 MMOL/L (ref 136–145)
TRIGL SERPL-MCNC: 95 MG/DL (ref 34–140)
VLDLC SERPL CALC-MCNC: 19 MG/DL
WBC # SPEC AUTO: 3.85 X10(3)/MCL (ref 3.4–9.2)

## 2021-02-23 ENCOUNTER — HISTORICAL (OUTPATIENT)
Dept: RADIOLOGY | Facility: HOSPITAL | Age: 71
End: 2021-02-23

## 2021-02-23 LAB
ABS NEUT (OLG): 1.62
ALBUMIN SERPL-MCNC: 4 GM/DL (ref 3.4–4.8)
ALBUMIN/GLOB SERPL: 1.2 RATIO (ref 1.1–2)
ALP SERPL-CCNC: 92 UNIT/L (ref 40–150)
ALT SERPL-CCNC: 18 UNIT/L (ref 0–55)
ANISOCYTOSIS BLD QL SMEAR: SLIGHT
AST SERPL-CCNC: 24 UNIT/L (ref 5–34)
BILIRUB SERPL-MCNC: 0.6 MG/DL
BILIRUBIN DIRECT+TOT PNL SERPL-MCNC: 0.2 MG/DL (ref 0–0.5)
BILIRUBIN DIRECT+TOT PNL SERPL-MCNC: 0.4 MG/DL
BUN SERPL-MCNC: 20 MG/DL (ref 8.4–25.7)
CALCIUM SERPL-MCNC: 9.4 MG/DL (ref 8.8–10)
CHLORIDE SERPL-SCNC: 105 MMOL/L (ref 98–107)
CO2 SERPL-SCNC: 30 MEQ/L (ref 23–31)
CREAT SERPL-MCNC: 1.21 MG/DL (ref 0.73–1.18)
EOSINOPHIL NFR BLD MANUAL: 3 % (ref 0–8)
ERYTHROCYTE [DISTWIDTH] IN BLOOD BY AUTOMATED COUNT: 13 %
GLOBULIN SER-MCNC: 3.3 GM/DL (ref 2.4–3.5)
GLUCOSE SERPL-MCNC: 103 MG/DL (ref 82–115)
GRANULOCYTES NFR BLD MANUAL: 44 % (ref 47–80)
HCT VFR BLD AUTO: 38 % (ref 39–49)
HGB BLD-MCNC: 12.4 GM/DL (ref 12.6–16.6)
LYMPHOCYTES NFR BLD MANUAL: 42 % (ref 13–40)
MACROCYTES BLD QL SMEAR: ABNORMAL
MCH RBC QN AUTO: 33.4 PG (ref 27–33)
MCHC RBC AUTO-ENTMCNC: 32.6 GM/DL (ref 32–35)
MCV RBC AUTO: 102.4 FL (ref 84–97)
MONOCYTES NFR BLD MANUAL: 11 % (ref 2–11)
PLATELET # BLD AUTO: 195 X10(3)/MCL (ref 140–450)
PLATELET # BLD EST: ADEQUATE 10*3/UL
PMV BLD AUTO: 10 FL
POTASSIUM SERPL-SCNC: 5.2 MMOL/L (ref 3.5–5.1)
PROT SERPL-MCNC: 7.3 GM/DL (ref 5.8–7.6)
RBC # BLD AUTO: 3.71 X10(6)/MCL (ref 4.3–5.6)
SARS-COV-2 AG RESP QL IA.RAPID: NEGATIVE
SODIUM SERPL-SCNC: 145 MMOL/L (ref 136–145)
WBC # SPEC AUTO: 3.95 X10(3)/MCL (ref 3.4–9.2)

## 2021-02-25 ENCOUNTER — HISTORICAL (OUTPATIENT)
Dept: MEDSURG UNIT | Facility: HOSPITAL | Age: 71
End: 2021-02-25

## 2021-10-14 ENCOUNTER — HISTORICAL (OUTPATIENT)
Dept: ADMINISTRATIVE | Facility: HOSPITAL | Age: 71
End: 2021-10-14

## 2021-11-02 ENCOUNTER — HISTORICAL (OUTPATIENT)
Dept: LAB | Facility: HOSPITAL | Age: 71
End: 2021-11-02

## 2021-11-02 LAB
ABS NEUT (OLG): 1.56
ALBUMIN SERPL-MCNC: 4 GM/DL (ref 3.4–4.8)
ALBUMIN/GLOB SERPL: 1.2 RATIO (ref 1.1–2)
ALP SERPL-CCNC: 85 UNIT/L (ref 40–150)
ALT SERPL-CCNC: 21 UNIT/L (ref 0–55)
AST SERPL-CCNC: 25 UNIT/L (ref 5–34)
BASOPHILS # BLD AUTO: 0.01 X10(3)/MCL
BASOPHILS NFR BLD AUTO: 0.3 %
BILIRUB SERPL-MCNC: 0.7 MG/DL
BILIRUBIN DIRECT+TOT PNL SERPL-MCNC: 0.2 MG/DL (ref 0–0.5)
BILIRUBIN DIRECT+TOT PNL SERPL-MCNC: 0.5 MG/DL
BUN SERPL-MCNC: 17 MG/DL (ref 8.4–25.7)
CALCIUM SERPL-MCNC: 10 MG/DL (ref 8.7–10.5)
CHLORIDE SERPL-SCNC: 107 MMOL/L (ref 98–107)
CHOLEST SERPL-MCNC: 175 MG/DL
CHOLEST/HDLC SERPL: 4 {RATIO} (ref 0–5)
CO2 SERPL-SCNC: 29 MEQ/L (ref 23–31)
CREAT SERPL-MCNC: 1.01 MG/DL (ref 0.73–1.18)
EOSINOPHIL # BLD AUTO: 0.08 X10(3)/MCL
EOSINOPHIL NFR BLD AUTO: 2.2 %
ERYTHROCYTE [DISTWIDTH] IN BLOOD BY AUTOMATED COUNT: 12 %
GLOBULIN SER-MCNC: 3.3 GM/DL (ref 2.4–3.5)
GLUCOSE SERPL-MCNC: 96 MG/DL (ref 82–115)
HCT VFR BLD AUTO: 40.7 % (ref 39–49)
HDLC SERPL-MCNC: 49 MG/DL (ref 35–60)
HGB BLD-MCNC: 13.3 GM/DL (ref 12.6–16.6)
IMM GRANULOCYTES # BLD AUTO: 0 10*3/UL (ref 0–0.1)
IMM GRANULOCYTES NFR BLD AUTO: 0 % (ref 0–1)
LDLC SERPL CALC-MCNC: 107 MG/DL (ref 50–140)
LYMPHOCYTES # BLD AUTO: 1.5 X10(3)/MCL
LYMPHOCYTES NFR BLD AUTO: 41.7 %
MCH RBC QN AUTO: 33.1 PG (ref 27–33)
MCHC RBC AUTO-ENTMCNC: 32.7 GM/DL (ref 32–35)
MCV RBC AUTO: 101.2 FL (ref 84–97)
MONOCYTES # BLD AUTO: 0.45 X10(3)/MCL
MONOCYTES NFR BLD AUTO: 12.5 %
NEUTROPHILS # BLD AUTO: 1.56 X10(3)/MCL
NEUTROPHILS NFR BLD AUTO: 43.3 %
PLATELET # BLD AUTO: 188 X10(3)/MCL (ref 140–450)
PMV BLD AUTO: 10 FL
POTASSIUM SERPL-SCNC: 5.2 MMOL/L (ref 3.5–5.1)
PROT SERPL-MCNC: 7.3 GM/DL (ref 5.8–7.6)
PSA SERPL-MCNC: 1.91 NG/ML
RBC # BLD AUTO: 4.02 X10(6)/MCL (ref 4.3–5.6)
SODIUM SERPL-SCNC: 143 MMOL/L (ref 136–145)
TRIGL SERPL-MCNC: 97 MG/DL (ref 34–140)
VLDLC SERPL CALC-MCNC: 19 MG/DL
WBC # SPEC AUTO: 3.6 X10(3)/MCL (ref 3.4–9.2)

## 2022-03-22 ENCOUNTER — HISTORICAL (OUTPATIENT)
Dept: LAB | Facility: HOSPITAL | Age: 72
End: 2022-03-22

## 2022-03-22 ENCOUNTER — HISTORICAL (OUTPATIENT)
Dept: ADMINISTRATIVE | Facility: HOSPITAL | Age: 72
End: 2022-03-22

## 2022-03-22 LAB
ABS NEUT (OLG): 1.93
ALBUMIN SERPL-MCNC: 3.8 G/DL (ref 3.4–4.8)
ALBUMIN/GLOB SERPL: 1.1 {RATIO} (ref 1.1–2)
ALP SERPL-CCNC: 88 U/L (ref 40–150)
ALT SERPL-CCNC: 18 U/L (ref 0–55)
AST SERPL-CCNC: 22 U/L (ref 5–34)
BILIRUB SERPL-MCNC: 0.7 MG/DL
BILIRUBIN DIRECT+TOT PNL SERPL-MCNC: 0.2 (ref 0–0.5)
BILIRUBIN DIRECT+TOT PNL SERPL-MCNC: 0.5
BUN SERPL-MCNC: 12 MG/DL (ref 8.4–25.7)
CALCIUM SERPL-MCNC: 9.4 MG/DL (ref 8.7–10.5)
CHLORIDE SERPL-SCNC: 104 MMOL/L (ref 98–107)
CO2 SERPL-SCNC: 30 MMOL/L (ref 23–31)
CREAT SERPL-MCNC: 0.93 MG/DL (ref 0.73–1.18)
EOSINOPHIL NFR BLD MANUAL: 2 % (ref 0–8)
ERYTHROCYTE [DISTWIDTH] IN BLOOD BY AUTOMATED COUNT: 13 %
GLOBULIN SER-MCNC: 3.6 G/DL (ref 2.4–3.5)
GLUCOSE SERPL-MCNC: 108 MG/DL (ref 82–115)
GRANULOCYTES NFR BLD MANUAL: 45 % (ref 47–80)
HCT VFR BLD AUTO: 40.8 % (ref 39–49)
HEMOLYSIS INTERF INDEX SERPL-ACNC: 4
HGB BLD-MCNC: 13.4 G/DL (ref 12.6–16.6)
HYPOCHROMIA BLD QL SMEAR: SLIGHT
ICTERIC INTERF INDEX SERPL-ACNC: 1
LIPEMIC INTERF INDEX SERPL-ACNC: 30
LYMPHOCYTES NFR BLD MANUAL: 39 % (ref 13–40)
MACROCYTES BLD QL SMEAR: NORMAL
MANUAL DIFF? (OHS): YES
MCH RBC QN AUTO: 34 PG (ref 27–33)
MCHC RBC AUTO-ENTMCNC: 32.8 G/DL (ref 32–35)
MCV RBC AUTO: 103.6 FL (ref 84–97)
MONOCYTES NFR BLD MANUAL: 11 % (ref 2–11)
NEUTS BAND NFR BLD MANUAL: 3 % (ref 0–11)
PLATELET # BLD AUTO: 221 10*3/UL (ref 140–450)
PLATELET # BLD EST: ADEQUATE 10*3/UL
PMV BLD AUTO: 10 FL
POTASSIUM SERPL-SCNC: 4.8 MMOL/L (ref 3.5–5.1)
PROT SERPL-MCNC: 7.4 G/DL (ref 5.8–7.6)
RBC # BLD AUTO: 3.94 10*6/UL (ref 4.3–5.6)
SARS-COV-2 AG RESP QL IA.RAPID: NEGATIVE
SODIUM SERPL-SCNC: 143 MMOL/L (ref 136–145)
WBC # SPEC AUTO: 4.36 10*3/UL (ref 3.4–9.2)

## 2022-03-24 ENCOUNTER — HISTORICAL (OUTPATIENT)
Dept: MEDSURG UNIT | Facility: HOSPITAL | Age: 72
End: 2022-03-24

## 2022-04-10 ENCOUNTER — HISTORICAL (OUTPATIENT)
Dept: ADMINISTRATIVE | Facility: HOSPITAL | Age: 72
End: 2022-04-10
Payer: MEDICARE

## 2022-04-30 VITALS
BODY MASS INDEX: 29.26 KG/M2 | WEIGHT: 209 LBS | HEIGHT: 71 IN | WEIGHT: 200 LBS | HEIGHT: 71 IN | SYSTOLIC BLOOD PRESSURE: 148 MMHG | DIASTOLIC BLOOD PRESSURE: 87 MMHG | BODY MASS INDEX: 28 KG/M2 | SYSTOLIC BLOOD PRESSURE: 122 MMHG | DIASTOLIC BLOOD PRESSURE: 79 MMHG

## 2022-04-30 NOTE — OP NOTE
DATE OF SURGERY:    02/25/2021    SURGEON:  Nick Perez MD    PREOPERATIVE DIAGNOSIS:  Chronic otitis media with effusion.    POSTOPERATIVE DIAGNOSIS:  Chronic otitis media with effusion.    PROCEDURE PERFORMED:  Myringotomy with placement of PE tube, right ear.    ANESTHESIA:  MAC anesthesia.    INDICATIONS FOR PROCEDURE:  The patient had a longstanding effusion with conductive hearing loss.  Patient has tried and failed conservative therapy with __________ corticosteroids and oil insufflation.    PROCEDURE IN DETAIL:  The patient was brought to the operative suite, placed in supine position.  Anesthesia administered MAC anesthesia.  Under the guidance of a microscope, the patient's right ear canal was cleared with a curet and myringotomy was done in the patient's anterior inferior quadrant.  Copious amounts of serous fluid was suctioned and a tube was placed.  The patient tolerated the procedure well without complications.        ______________________________  MD JERONIMO Newsome/LES  DD:  03/04/2021  Time:  05:46PM  DT:  03/04/2021  Time:  06:36PM  Job #:  [id number]

## 2022-05-02 NOTE — HISTORICAL [facility]
This is a historical note converted from Kaci. Formatting and pictures may have been removed.  Please reference Kaci for original formatting and attached multimedia. Chief Complaint  New Patient referred by Dr Blakely for left foot and elbow pain. Denies any previous surgery to left foot or elbow. States having tenderness in left foot, reports occasional sharp pain.Reports fell backwards about a month ago and has a knot in left elbow  History of Present Illness  70-year-old male presents here for evaluation of left foot pain and left elbow pain.? Patient has noted early swelling?to his left elbow about 5 to 7 days ago. ?Does report falling on it about a month ago. ?Denies any significant or severe pain.? No?redness. ?No fevers no chills.? Patient noted some pain to his left foot?off and on for the last 4 to 6 months. ?It is located on the lateral aspect of his foot. ?Worse when he ambulates. ?Does feel like he walks with a slightly pronated foot.? Has not tried physical therapy anti-inflammatories?or inserts. ?Denies any radiation proximally or distally.  Review of Systems  Denies fevers, chills, chest pain, shortness of breath. Comprehensive review of systems performed and otherwise negative except as noted in HPI.  Physical Exam  Vitals & Measurements  T:?97.6? ?F (Oral)? HR:?64(Peripheral)? BP:?122/79?  HT:?180.00?cm? WT:?90.710?kg? BMI:?28?  General: No acute distress, alert and oriented, healthy appearing?  HEENT: Head is atraumatic, mucous membranes are moist?  Cardiovascular: Brisk capillary refill  Lungs: Breathing non-labored?  Skin: no rashes appreciated?  Neurologic: Sensation is grossly intact distally  ?  Left foot:  Patient with cavus foot deformity. ?It is flexible. ?Has tenderness?at the base of his fifth metatarsal liver peroneal tendon insertion.? No pain with eversion. ?No pain with inversion.? Ambulates without an antalgic gait.  ?  Left elbow:  Patient full extension. ?Flexion to greater  than 130 degrees. ?No significant crepitus. ?Stable to varus and valgus. ?Does have fairly?swollen olecranon bursa. ?Is not red. ?Is not inflamed.  Assessment/Plan  1.?Olecranon bursitis, left elbow?M70.22  ?Patient electron bursitis left elbow. ?Discussed all treatment options with this today. ?We will start with some compression?and try to treat this conservatively given its early new onset. ?If this fails we could perform an aspiration/injection if necessary.  Ordered:  diclofenac topical, 2 gm =, TOP, QID, PRN PRN as needed for pain, # 100 gm, 0 Refill(s), Pharmacy: [facility] [place], 180, cm, Height/Length Dosing, 10/14/21 9:15:00 CDT, 90.71, kg, Weight Dosing, 10/14/21 9:15:00 CDT  Clinic Follow up, *Est. 11/11/21 3:00:00 CST, Order for future visit, Olecranon bursitis, left elbow  Peroneal tendinitis, left leg, [facility]  Office/Outpatient Visit Level 4 [id number] [facility], Olecranon bursitis, left elbow  Peroneal tendinitis, left leg, [facility], 10/14/21 10:06:00 CDT  ?  2.?Peroneal tendinitis, left leg?M76.72  ?Patient peroneal tendinitis versus stress fracture to the base of his fifth metatarsal. ?The stress fracture appears to be healed. ?Does have a cavus foot deformity?we will plan to treat this with a shoe insert.? Plan to see him back in a month to check his progress from this as well.  Ordered:  diclofenac topical, 2 gm =, TOP, QID, PRN PRN as needed for pain, # 100 gm, 0 Refill(s), Pharmacy: [facility], 180, cm, Height/Length Dosing, 10/14/21 9:15:00 CDT, 90.71, kg, Weight Dosing, 10/14/21 9:15:00 CDT  Clinic Follow up, *Est. 11/11/21 3:00:00 CST, Order for future visit, Olecranon bursitis, left elbow  Peroneal tendinitis, left leg, [facility]  Office/Outpatient Visit Level 4 New [id number], Olecranon bursitis, left elbow  Peroneal tendinitis, left leg, [facility], 10/14/21 10:06:00 CDT  ?  Orders:  XR Elbow Left Minimum 3 Views, Routine,  10/14/21 9:22:00 CDT, None, Patient Bed, Patient Has IV?, Rad Type, Left elbow pain, Not Scheduled, 10/14/21 9:22:00 CDT  XR Foot Left Minimum 3 Views, Routine, 10/14/21 9:22:00 CDT, None, Patient Bed, Patient Has IV?, Rad Type, Left foot pain, Not Scheduled, 10/14/21 9:22:00 CDT  Referrals  Clinic Follow up, *Est. 11/11/21 3:00:00 CST, Order for future visit, Olecranon bursitis, left elbow  Peroneal tendinitis, left leg, [facility]   Problem List/Past Medical History  Ongoing  ADHD - Attention deficit disorder with hyperactivity  Afib  Aneurysm of aortic root  Anticoagulant therapy  Anxiety  Aortic insufficiency  Chronic atrial fibrillation  CPAP - Continuous positive airways pressure  Dyspnea on exertion  Finger laceration with complication  Laceration of finger  Mitral valve regurgitation  Sleep apnea  Historical  None of the time  Procedure/Surgical History  Anesthesia for procedures on external, middle, and inner ear including biopsy; tympanotomy (02/25/2021)  Drainage of Right Middle Ear with Drainage Device, Open Approach (02/25/2021)  Myringotomy (None) (02/25/2021)  Tympanostomy (requiring insertion of ventilating tube), general anesthesia (02/25/2021)  Bypass Valve Replacement Aortic, Mitral (.) (04/29/2019)  Destruction of Conduction Mechanism, Open Approach (04/29/2019)  Excision of Left Atrial Appendage, Open Approach (04/29/2019)  Performance of Cardiac Output, Continuous (04/29/2019)  Replacement of Aortic Valve with Zooplastic Tissue, Open Approach (04/29/2019)  Supplement Mitral Valve with Zooplastic Tissue, Open Approach (04/29/2019)  Supplement Thoracic Aorta, Ascending/Arch with Zooplastic Tissue, Open Approach (04/29/2019)  Anesthesia for lower intestinal endoscopic procedures, endoscope introduced distal to duodenum (03/22/2017)  Colonoscopy (03/22/2017)  Colonoscopy, flexible; with biopsy, single or multiple (03/22/2017)  Colonoscopy, flexible; with biopsy, single or multiple  (03/22/2017)  Excision of Ascending Colon, Via Natural or Artificial Opening Endoscopic, Diagnostic (03/22/2017)  Excision of Ascending Colon, Via Natural or Artificial Opening Endoscopic, Diagnostic (03/22/2017)  Colonoscopy (03.2017)  Angiogram  Cardioversion  Catheter ablation for cardiac arrhythmia  lt Knee ACL  ZOILA procedure  Tonsillectomy   Medications  Adult Aspirin 81 mg oral delayed release tablet, Daily  amiodarone 200 mg oral Tab, 200 mg= 1 tab(s), Oral, Daily  diclofenac 1% topical gel, 2 gm, TOP, QID, PRN  Entresto 24 mg-26 mg oral tablet, 1 tab(s), Oral, BID  fluoxetine 20 mg oral capsule, 1 cap(s), Oral, Daily  fluticasone 50 mcg/inh nasal spray, 1 spray(s), Daily  furosemide 40 mg oral tablet, 20 mg= 0.5 tab(s), Oral, Daily  Lasix 40 mg oral tablet, 40 mg= 1 tab(s), Oral, Daily,? ?Not taking  metoprolol succinate 25 mg oral tablet, extended release, 25 mg= 1 tab(s), Oral, BID  Multi Vitamin+, 1 tab(s), Oral, Daily,? ?Not taking  Ofloxacin Otic 0.3% solution (ENT clinic), 5 drop(s), BID,? ?Not taking  pravastatin 10 mg oral tablet, 10 mg= 1 tab(s), Oral, Daily  Voltaren 1% topical gel, 2 gm, TOP, BID, PRN,? ?Not taking  Xarelto 20mg Tablet, 20 mg= 1 tab(s), Oral, Daily  Allergies  No Known Allergies  Social History  Abuse/Neglect  No, No, Yes, 10/14/2021  Alcohol  Past, 10/14/2021  Employment/School  Retired, 01/11/2021  Exercise  Exercise duration: 5. Exercise frequency: 1-2 times/week. Exercise type: Walking., 01/11/2021  Financial/Legal Situation  None, 02/19/2021  Home/Environment  Lives with Significant other. Living situation: Home/Independent. TV/Computer concerns: No., 01/11/2021    Never in , 02/19/2021  Nutrition/Health  Regular, Good, 01/11/2021  Sexual  Sexually active: Yes. Sexual orientation: Straight or heterosexual. Gender Identity Identifies as male., 01/11/2021  Spiritual/Cultural  Taoist, 02/19/2021  Substance Use  Never, Drug use interferes with work/home: No.  Household substance abuse concerns: No., 01/11/2021  Tobacco  Never (less than 100 in lifetime), No, 10/14/2021  Family History  Atrial fibrillation: Sister.  Cancer - unknown origin: Father.  Heart disease: Brother.  Myocardial infarction: Mother.  Immunizations  Vaccine Date Status   influenza virus vaccine, inactivated 10/12/2021 Given   pneumococcal 23-polyvalent vaccine 04/26/2021 Given   tetanus/diphtheria/pertussis, acel(Tdap) 01/10/2021 Given   influenza virus vaccine, inactivated 10/26/2020 Given   Health Maintenance  Health Maintenance  ???Pending?(in the next year)  ??? ??Due?  ??? ? ? ?HF-LVEF due??09/29/21??and every 1??year(s)  ??? ? ? ?Zoster Vaccine due??10/14/21??Unknown Frequency  ??? ??Due In Future?  ??? ? ? ?Medicare Annual Wellness Exam not due until??10/26/21??and every 1??year(s)  ??? ? ? ?Obesity Screening not due until??01/01/22??and every 1??year(s)  ??? ? ? ?Advance Directive not due until??01/02/22??and every 1??year(s)  ??? ? ? ?Alcohol Misuse Screening not due until??01/02/22??and every 1??year(s)  ??? ? ? ?Cognitive Screening not due until??01/02/22??and every 1??year(s)  ??? ? ? ?Fall Risk Assessment not due until??01/02/22??and every 1??year(s)  ??? ? ? ?Functional Assessment not due until??01/02/22??and every 1??year(s)  ??? ? ? ?HF-Heart Failure Education not due until??01/11/22??and every 1??year(s)  ??? ? ? ?ADL Screening not due until??02/19/22??and every 1??year(s)  ??? ? ? ?Hypertension Management-BMP not due until??02/23/22??and every 1??year(s)  ??? ? ? ?Aspirin Therapy for CVD Prevention not due until??02/23/22??and every 1??year(s)  ???Satisfied?(in the past 1 year)  ??? ??Satisfied?  ??? ? ? ?ADL Screening on??02/19/21.??Satisfied by Ann Marie Monteiro LPN  ??? ? ? ?Advance Directive on??10/12/21.??Satisfied by Ann Marie Monteiro LPN  ??? ? ? ?Alcohol Misuse Screening on??02/19/21.??Satisfied by Ann Marie Monteiro LPN  ??? ? ? ?Aspirin Therapy for CVD Prevention on??02/23/21.  ??? ? ? ?Blood  Pressure Screening on??10/14/21.??Satisfied by Eusebia Torres  ??? ? ? ?Body Mass Index Check on??10/14/21.??Satisfied by Eusebia Torres  ??? ? ? ?Cognitive Screening on??10/12/21.??Satisfied by Ann Marie Monteiro LPN  ??? ? ? ?Coronary Artery Disease Maintenance-Antiplatelet Agent Prescribed on??02/23/21.  ??? ? ? ?Depression Screening on??10/14/21.??Satisfied by Eusebia Torres  ??? ? ? ?Diabetes Maintenance-Fasting Lipid Profile on??10/20/20.??Satisfied by Xochilt Man  ??? ? ? ?Diabetes Screening on??02/23/21.??Satisfied by Kamla Zelaya  ??? ? ? ?Fall Risk Assessment on??10/14/21.??Satisfied by Eusebia Torres  ??? ? ? ?Functional Assessment on??10/12/21.??Satisfied by Ann Marie Monteiro LPN  ??? ? ? ?Hypertension Management-Blood Pressure on??10/14/21.??Satisfied by Eusebia Torres  ??? ? ? ?Influenza Vaccine on??10/12/21.??Satisfied by Ann Marie Monteiro LPN  ??? ? ? ?Lipid Screening on??10/20/20.??Satisfied by Xochilt Man  ??? ? ? ?Medicare Annual Wellness Exam on??10/26/20.??Satisfied by Stu Blkaely MD  ??? ? ? ?Obesity Screening on??10/14/21.??Satisfied by Eusebia Torres  ??? ? ? ?Pneumococcal Vaccine on??04/26/21.??Satisfied by Ann Marie Monteiro LPN  ??? ? ? ?Tetanus Vaccine on??01/10/21.??Satisfied by Mira Botello RN  ?  Diagnostic Results  AP lateral left elbow and foot reviewed. ?No displaced fractures noted to his?elbow. ?Does have?swelling and some early arthritic change?to his?left elbow. ?Left foot?does show a healed stress fracture of the base of the fifth metatarsal. ?Otherwise no displaced fractures or arthritic change seen.

## 2022-05-14 NOTE — OP NOTE
DATE OF SURGERY:    03/24/2022    SURGEON:  Nick Perez MD    PREOPERATIVE DIAGNOSIS:  Eustachian tube dysfunction, right ear.    POSTOPERATIVE DIAGNOSIS:  Eustachian tube dysfunction, right ear.    PROCEDURE PERFORMED:  Myringotomy with placement of T-tube with aid of microscope.    ANESTHESIA:  Sedation.    INDICATION FOR PROCEDURE:  The patient with persistent eustachian tube dysfunction with conductive hearing loss, here for the above-listed procedure.    PROCEDURE IN DETAIL:  The patient was brought to the operative suite and placed in supine position.  Anesthesia administered sedation.  The patient and the right ear, which was marked preoperatively, was examined and cleaned with a curette to expose the patient's tympanic membrane.  An incision was made at the anterior-inferior quadrant and a T-tube was placed within this myringotomy.  Otic topical drops were placed in the ear as well as cotton.  Patient tolerated the procedure well without complications.        ______________________________  Nick Perez MD    [facility]  DD:  03/24/2022  Time:  11:48AM  DT:  03/24/2022  Time:  12:04PM  Job #:  [id number]

## 2022-06-01 ENCOUNTER — LAB VISIT (OUTPATIENT)
Dept: LAB | Facility: HOSPITAL | Age: 72
End: 2022-06-01
Attending: INTERNAL MEDICINE
Payer: MEDICARE

## 2022-06-01 DIAGNOSIS — I48.0 PAROXYSMAL ATRIAL FIBRILLATION: ICD-10-CM

## 2022-06-01 DIAGNOSIS — I10 HYPERTENSION, UNSPECIFIED TYPE: Primary | ICD-10-CM

## 2022-06-01 DIAGNOSIS — I51.9 LEFT VENTRICULAR DYSFUNCTION: ICD-10-CM

## 2022-06-01 LAB
ALBUMIN SERPL-MCNC: 3.9 GM/DL (ref 3.4–4.8)
ALBUMIN/GLOB SERPL: 1.2 RATIO (ref 1.1–2)
ALP SERPL-CCNC: 83 UNIT/L (ref 40–150)
ALT SERPL-CCNC: 19 UNIT/L (ref 0–55)
AST SERPL-CCNC: 22 UNIT/L (ref 5–34)
BILIRUBIN DIRECT+TOT PNL SERPL-MCNC: 0.6 MG/DL
BUN SERPL-MCNC: 17 MG/DL (ref 8.4–25.7)
CALCIUM SERPL-MCNC: 8.8 MG/DL (ref 8.8–10)
CHLORIDE SERPL-SCNC: 105 MMOL/L (ref 98–107)
CHOLEST SERPL-MCNC: 157 MG/DL
CHOLEST/HDLC SERPL: 3 {RATIO} (ref 0–5)
CO2 SERPL-SCNC: 30 MMOL/L (ref 23–31)
CREAT SERPL-MCNC: 1 MG/DL (ref 0.73–1.18)
GLOBULIN SER-MCNC: 3.2 GM/DL (ref 2.4–3.5)
GLUCOSE SERPL-MCNC: 96 MG/DL (ref 82–115)
HDLC SERPL-MCNC: 45 MG/DL (ref 35–60)
LDLC SERPL CALC-MCNC: 88 MG/DL (ref 50–140)
POTASSIUM SERPL-SCNC: 4.3 MMOL/L (ref 3.5–5.1)
PROT SERPL-MCNC: 7.1 GM/DL (ref 5.8–7.6)
SODIUM SERPL-SCNC: 142 MMOL/L (ref 136–145)
TRIGL SERPL-MCNC: 119 MG/DL (ref 34–140)
VLDLC SERPL CALC-MCNC: 24 MG/DL

## 2022-06-01 PROCEDURE — 36415 COLL VENOUS BLD VENIPUNCTURE: CPT

## 2022-06-01 PROCEDURE — 80061 LIPID PANEL: CPT

## 2022-06-01 PROCEDURE — 80053 COMPREHEN METABOLIC PANEL: CPT

## 2022-11-07 DIAGNOSIS — Z11.59 NEED FOR HEPATITIS C SCREENING TEST: ICD-10-CM

## 2022-11-07 DIAGNOSIS — Z00.00 WELLNESS EXAMINATION: Primary | ICD-10-CM

## 2022-11-07 DIAGNOSIS — Z12.5 SCREENING FOR MALIGNANT NEOPLASM OF PROSTATE: ICD-10-CM

## 2022-11-07 DIAGNOSIS — Z13.6 SCREENING FOR ISCHEMIC HEART DISEASE: ICD-10-CM

## 2022-11-07 DIAGNOSIS — Z11.4 ENCOUNTER FOR SCREENING FOR HIV: ICD-10-CM

## 2022-11-22 ENCOUNTER — LAB VISIT (OUTPATIENT)
Dept: LAB | Facility: HOSPITAL | Age: 72
End: 2022-11-22
Attending: FAMILY MEDICINE
Payer: MEDICARE

## 2022-11-22 DIAGNOSIS — Z00.00 WELLNESS EXAMINATION: ICD-10-CM

## 2022-11-22 DIAGNOSIS — Z11.59 NEED FOR HEPATITIS C SCREENING TEST: ICD-10-CM

## 2022-11-22 DIAGNOSIS — Z11.4 ENCOUNTER FOR SCREENING FOR HIV: ICD-10-CM

## 2022-11-22 DIAGNOSIS — Z12.5 SCREENING FOR MALIGNANT NEOPLASM OF PROSTATE: ICD-10-CM

## 2022-11-22 LAB
ALBUMIN SERPL-MCNC: 4 GM/DL (ref 3.4–4.8)
ALBUMIN/GLOB SERPL: 1.3 RATIO (ref 1.1–2)
ALP SERPL-CCNC: 89 UNIT/L (ref 40–150)
ALT SERPL-CCNC: 18 UNIT/L (ref 0–55)
AST SERPL-CCNC: 22 UNIT/L (ref 5–34)
BASOPHILS # BLD AUTO: 0.01 X10(3)/MCL (ref 0–0.2)
BASOPHILS NFR BLD AUTO: 0.3 %
BILIRUBIN DIRECT+TOT PNL SERPL-MCNC: 0.5 MG/DL
BUN SERPL-MCNC: 16 MG/DL (ref 8.4–25.7)
CALCIUM SERPL-MCNC: 9.8 MG/DL (ref 8.8–10)
CHLORIDE SERPL-SCNC: 107 MMOL/L (ref 98–107)
CO2 SERPL-SCNC: 30 MMOL/L (ref 23–31)
CREAT SERPL-MCNC: 0.87 MG/DL (ref 0.73–1.18)
EOSINOPHIL # BLD AUTO: 0.08 X10(3)/MCL (ref 0–0.9)
EOSINOPHIL NFR BLD AUTO: 2.1 %
ERYTHROCYTE [DISTWIDTH] IN BLOOD BY AUTOMATED COUNT: 11.5 % (ref 11.5–17)
GFR SERPLBLD CREATININE-BSD FMLA CKD-EPI: >60 MLS/MIN/1.73/M2
GLOBULIN SER-MCNC: 3.2 GM/DL (ref 2.4–3.5)
GLUCOSE SERPL-MCNC: 96 MG/DL (ref 82–115)
HCT VFR BLD AUTO: 40.7 % (ref 42–52)
HCV AB SERPL QL IA: NONREACTIVE
HGB BLD-MCNC: 13.9 GM/DL (ref 14–18)
HIV 1+2 AB+HIV1 P24 AG SERPL QL IA: NONREACTIVE
IMM GRANULOCYTES # BLD AUTO: 0.01 X10(3)/MCL (ref 0–0.04)
IMM GRANULOCYTES NFR BLD AUTO: 0.3 %
LYMPHOCYTES # BLD AUTO: 1.65 X10(3)/MCL (ref 0.6–4.6)
LYMPHOCYTES NFR BLD AUTO: 43.1 %
MCH RBC QN AUTO: 33.7 PG (ref 27–31)
MCHC RBC AUTO-ENTMCNC: 34.2 MG/DL (ref 33–36)
MCV RBC AUTO: 98.5 FL (ref 80–94)
MONOCYTES # BLD AUTO: 0.49 X10(3)/MCL (ref 0.1–1.3)
MONOCYTES NFR BLD AUTO: 12.8 %
NEUTROPHILS # BLD AUTO: 1.6 X10(3)/MCL (ref 2.1–9.2)
NEUTROPHILS NFR BLD AUTO: 41.4 %
NRBC BLD AUTO-RTO: 0 %
PLATELET # BLD AUTO: 167 X10(3)/MCL (ref 130–400)
PMV BLD AUTO: 10.5 FL (ref 7.4–10.4)
POTASSIUM SERPL-SCNC: 5.2 MMOL/L (ref 3.5–5.1)
PROT SERPL-MCNC: 7.2 GM/DL (ref 5.8–7.6)
PSA SERPL-MCNC: 1.82 NG/ML
RBC # BLD AUTO: 4.13 X10(6)/MCL (ref 4.7–6.1)
SODIUM SERPL-SCNC: 143 MMOL/L (ref 136–145)
WBC # SPEC AUTO: 3.8 X10(3)/MCL (ref 4.5–11.5)

## 2022-11-22 PROCEDURE — 87389 HIV-1 AG W/HIV-1&-2 AB AG IA: CPT

## 2022-11-22 PROCEDURE — 36415 COLL VENOUS BLD VENIPUNCTURE: CPT

## 2022-11-22 PROCEDURE — 80053 COMPREHEN METABOLIC PANEL: CPT

## 2022-11-22 PROCEDURE — 85025 COMPLETE CBC W/AUTO DIFF WBC: CPT

## 2022-11-22 PROCEDURE — 86803 HEPATITIS C AB TEST: CPT

## 2022-11-22 PROCEDURE — 84153 ASSAY OF PSA TOTAL: CPT

## 2022-11-29 ENCOUNTER — OFFICE VISIT (OUTPATIENT)
Dept: FAMILY MEDICINE | Facility: CLINIC | Age: 72
End: 2022-11-29
Payer: MEDICARE

## 2022-11-29 VITALS
WEIGHT: 207 LBS | BODY MASS INDEX: 28.98 KG/M2 | OXYGEN SATURATION: 96 % | HEART RATE: 67 BPM | RESPIRATION RATE: 18 BRPM | DIASTOLIC BLOOD PRESSURE: 66 MMHG | HEIGHT: 71 IN | TEMPERATURE: 97 F | SYSTOLIC BLOOD PRESSURE: 102 MMHG

## 2022-11-29 DIAGNOSIS — I77.810 THORACIC AORTIC ECTASIA: ICD-10-CM

## 2022-11-29 DIAGNOSIS — Z00.00 WELLNESS EXAMINATION: Primary | ICD-10-CM

## 2022-11-29 PROBLEM — I48.91 ATRIAL FIBRILLATION: Status: ACTIVE | Noted: 2022-11-29

## 2022-11-29 PROBLEM — I10 ESSENTIAL HYPERTENSION: Status: ACTIVE | Noted: 2019-04-09

## 2022-11-29 PROBLEM — F41.9 ANXIETY: Status: ACTIVE | Noted: 2019-04-09

## 2022-11-29 PROBLEM — G47.30 SLEEP APNEA: Status: ACTIVE | Noted: 2022-11-29

## 2022-11-29 PROBLEM — E78.5 HLD (HYPERLIPIDEMIA): Status: ACTIVE | Noted: 2019-04-09

## 2022-11-29 PROBLEM — I71.9 AORTIC ANEURYSM: Status: ACTIVE | Noted: 2019-04-09

## 2022-11-29 PROCEDURE — G0439 PR MEDICARE ANNUAL WELLNESS SUBSEQUENT VISIT: ICD-10-PCS | Mod: ,,, | Performed by: FAMILY MEDICINE

## 2022-11-29 PROCEDURE — G0439 PPPS, SUBSEQ VISIT: HCPCS | Mod: ,,, | Performed by: FAMILY MEDICINE

## 2022-11-29 RX ORDER — SACUBITRIL AND VALSARTAN 24; 26 MG/1; MG/1
0.5 TABLET, FILM COATED ORAL 2 TIMES DAILY
COMMUNITY
Start: 2022-10-07

## 2022-11-29 RX ORDER — FUROSEMIDE 40 MG/1
TABLET ORAL
COMMUNITY
Start: 2022-06-07

## 2022-11-29 RX ORDER — PRAVASTATIN SODIUM 40 MG/1
40 TABLET ORAL
COMMUNITY
Start: 2022-11-01

## 2022-11-29 RX ORDER — DICLOFENAC SODIUM 10 MG/G
2 GEL TOPICAL
COMMUNITY
Start: 2021-10-14 | End: 2023-08-15

## 2022-11-29 RX ORDER — ASPIRIN 81 MG/1
81 TABLET ORAL
COMMUNITY

## 2022-11-29 RX ORDER — IPRATROPIUM BROMIDE 21 UG/1
1 SPRAY, METERED NASAL 2 TIMES DAILY
COMMUNITY
Start: 2022-10-06 | End: 2023-08-15

## 2022-11-29 RX ORDER — METOPROLOL SUCCINATE 25 MG/1
25 TABLET, EXTENDED RELEASE ORAL
COMMUNITY
Start: 2022-11-01

## 2022-11-29 NOTE — PROGRESS NOTES
Patient ID: [id number]     Chief Complaint: wellness and sob with exertion      HPI:     Perry Miller is a 72 y.o. male here today for a Medicare Wellness.       Opioid Screening: Patient medication list reviewed, patient is not taking prescription opioids. Patient is not using additional opioids than prescribed. Patient is not at low risk of substance abuse based on this opioid use history.       ----------------------------  Hyperlipidemia  Hypertension     Past Surgical History:   Procedure Laterality Date    CORONARY ARTERY BYPASS GRAFT      INNER EAR SURGERY         Review of patient's allergies indicates:  No Known Allergies    Outpatient Medications Marked as Taking for the 11/29/22 encounter (Office Visit) with Stu Blakely MD   Medication Sig Dispense Refill    aspirin (ECOTRIN) 81 MG EC tablet Take 81 mg by mouth.      diclofenac sodium (VOLTAREN) 1 % Gel Apply 2 g topically.      ENTRESTO 24-26 mg per tablet Take 0.5 tablets by mouth 2 (two) times daily.      FLUoxetine 20 MG capsule TAKE 1 CAPSULE BY MOUTH DAILY 90 capsule 1    furosemide (LASIX) 40 MG tablet       ipratropium (ATROVENT) 21 mcg (0.03 %) nasal spray 1 spray by Each Nostril route 2 (two) times daily.      metoprolol succinate (TOPROL-XL) 25 MG 24 hr tablet Take 25 mg by mouth.      multivitamin capsule Take 1 capsule by mouth once daily.      pravastatin (PRAVACHOL) 40 MG tablet Take 40 mg by mouth.         Social History     Socioeconomic History    Marital status:    Tobacco Use    Smoking status: Never    Smokeless tobacco: Never   Substance and Sexual Activity    Alcohol use: Not Currently        Family History   Problem Relation Age of Onset    Hypertension Mother     Heart disease Mother     Cancer Father     Diabetes Sister     Hypertension Brother     Heart disease Brother         Patient Care Team:  Stu Blakely MD as PCP - General (Family Medicine)  MD Anibal March MD as Consulting Physician  (Cardiology)  Shane Sotelo IV, MD as Consulting Physician (Cardiothoracic Surgery)       Subjective:     Review of Systems   Constitutional: Negative.    HENT: Negative.     Respiratory: Negative.          EDWARD, no orthopnea   Cardiovascular: Negative.    Gastrointestinal: Negative.    Genitourinary: Negative.    Musculoskeletal: Negative.    Neurological: Negative.    Endo/Heme/Allergies: Negative.    Psychiatric/Behavioral: Negative.         Patient Reported Health Risk Assessment  What [age] 70-79  Are you male or female?: Male  During the past four weeks, how much have you been bothered by emotional problems such as feeling anxious, depressed, irritable, sad, or downhearted and blue?: Not at all  During the past five weeks, has your physical and/or emotional health limited your social activities with family, friends, neighbors, or groups?: Not at all  During the past four weeks, how much bodily pain have you generally had?: No pain  During the past four weeks, was someone available to help if you needed and wanted help?: No, not at all  During the past four weeks, what was the hardest physical activity you could do for at least two minutes?: Moderate  Can you get to places out of walking distance without help?  (For example, can you travel alone on buses or taxis, or drive your own car?): Yes  Can you go shopping for groceries or clothes without someone's help?: Yes  Can you prepare your own meals?: Yes  Can you do your own housework without help?: Yes  Because of any health problems, do you need the help of another person with your personal care needs such as eating, bathing, dressing, or getting around the house?: No  Can you handle your own money without help?: Yes  During the past four weeks, how would you rate your health in general?: Good  How have things been going for you during the past four weeks?: Good and bad parts about equal  Are you having difficulties driving your car?: No  Do you  "always fasten your seat belt when you are in a car?: Yes, sometimes  How often in the past four weeks have you been bothered by falling or dizzy when standing up?: Never  How often in the past four weeks have you been bothered by sexual problems?: Never  How often in the past four weeks have you been bothered by trouble eating well?: Never  How often in the past four weeks have you been bothered by teeth or denture problems?: Never  How often in the past four weeks have you been bothered with problems using the telephone?: Never  How often in the past four weeks have you been bothered by tiredness or fatigue?: Never  Have you fallen two or more times in the past year?: No  Are you afraid of falling?: No  Are you a smoker?: No  During the past four weeks, how many drinks of wine, beer, or other alcoholic beverages did you have?: No alcohol at all  Do you exercise for about 20 minutes three or more days a week?: Yes, most of the time  Have you been given any information to help you with hazards in your house that might hurt you?: Yes  Have you been given any information to help you with keeping track of your medications?: Yes  How often do you have trouble taking medicines the way you've been told to take them?: I always take them as prescribed  How confident are you that you can control and manage most of your health problems?: Very confident  What is your race? (Check all that apply.):     Objective:     /66 (BP Location: Left arm, Patient Position: Sitting, BP Method: Large (Manual))   Pulse 67   Temp 97.3 °F (36.3 °C)   Resp 18   Ht 5' 11" (1.803 m)   Wt 93.9 kg (207 lb)   SpO2 96%   BMI 28.87 kg/m²     Physical Exam  Constitutional:       General: He is not in acute distress.     Appearance: Normal appearance.   Cardiovascular:      Rate and Rhythm: Normal rate and regular rhythm.   Pulmonary:      Effort: Pulmonary effort is normal.      Breath sounds: Normal breath sounds.   Abdominal:     "  General: Abdomen is flat. Bowel sounds are normal.      Palpations: Abdomen is soft.   Musculoskeletal:         General: Normal range of motion.   Neurological:      Mental Status: He is alert.   Psychiatric:         Mood and Affect: Mood normal.         Behavior: Behavior normal.         Thought Content: Thought content normal.         Judgment: Judgment normal.     Recent Results (from the past 504 hour(s))   Comprehensive Metabolic Panel    Collection Time: 11/22/22 10:30 AM   Result Value Ref Range    Sodium Level 143 136 - 145 mmol/L    Potassium Level 5.2 (H) 3.5 - 5.1 mmol/L    Chloride 107 98 - 107 mmol/L    Carbon Dioxide 30 23 - 31 mmol/L    Glucose Level 96 82 - 115 mg/dL    Blood Urea Nitrogen 16.0 8.4 - 25.7 mg/dL    Creatinine 0.87 0.73 - 1.18 mg/dL    Calcium Level Total 9.8 8.8 - 10.0 mg/dL    Protein Total 7.2 5.8 - 7.6 gm/dL    Albumin Level 4.0 3.4 - 4.8 gm/dL    Globulin 3.2 2.4 - 3.5 gm/dL    Albumin/Globulin Ratio 1.3 1.1 - 2.0 ratio    Bilirubin Total 0.5 <=1.5 mg/dL    Alkaline Phosphatase 89 40 - 150 unit/L    Alanine Aminotransferase 18 0 - 55 unit/L    Aspartate Aminotransferase 22 5 - 34 unit/L    eGFR >60 mls/min/1.73/m2   Hepatitis C Antibody    Collection Time: 11/22/22 10:30 AM   Result Value Ref Range    Hepatitis C Antibody Nonreactive Nonreactive   HIV 1/2 Ag/Ab (4th Gen)    Collection Time: 11/22/22 10:30 AM   Result Value Ref Range    HIV Nonreactive Nonreactive   PSA, Screening    Collection Time: 11/22/22 10:30 AM   Result Value Ref Range    Prostate Specific Antigen 1.82 <=4.00 ng/mL   CBC with Differential    Collection Time: 11/22/22 10:30 AM   Result Value Ref Range    WBC 3.8 (L) 4.5 - 11.5 x10(3)/mcL    RBC 4.13 (L) 4.70 - 6.10 x10(6)/mcL    Hgb 13.9 (L) 14.0 - 18.0 gm/dL    Hct 40.7 (L) 42.0 - 52.0 %    MCV 98.5 (H) 80.0 - 94.0 fL    MCH 33.7 (H) 27.0 - 31.0 pg    MCHC 34.2 33.0 - 36.0 mg/dL    RDW 11.5 11.5 - 17.0 %    Platelet 167 130 - 400 x10(3)/mcL    MPV 10.5 (H)  "7.4 - 10.4 fL    Neut % 41.4 %    Lymph % 43.1 %    Mono % 12.8 %    Eos % 2.1 %    Basophil % 0.3 %    Lymph # 1.65 0.6 - 4.6 x10(3)/mcL    Neut # 1.6 (L) 2.1 - 9.2 x10(3)/mcL    Mono # 0.49 0.1 - 1.3 x10(3)/mcL    Eos # 0.08 0 - 0.9 x10(3)/mcL    Baso # 0.01 0 - 0.2 x10(3)/mcL    IG# 0.01 0 - 0.04 x10(3)/mcL    IG% 0.3 %    NRBC% 0.0 %            No flowsheet data found.  Fall Risk Assessment - Outpatient 11/29/2022   Mobility Status Ambulatory   Number of falls 0   Identified as fall risk 0           Depression Screening  Over the past two weeks, has the patient felt down, depressed, or hopeless?: No  Over the past two weeks, has the patient felt little interest or pleasure in doing things?: No  Functional Ability/Safety Screening  Was the patient's timed Up & Go test unsteady or longer than 30 seconds?: No  Does the patient need help with phone, transportation, shopping, preparing meals, housework, laundry, meds, or managing money?: No  Does the patient's home have rugs in the hallway, lack grab bars in the bathroom, lack handrails on the stairs or have poor lighting?: No  Have you noticed any hearing difficulties?: No  A "Yes" response to any of the above questions should trigger further investigation.: n  Cognitive Function (Assessed through direct observation with due consideration of information obtained by way of patient reports and/or concerns raised by family, friends, caretakers, or others)    Does the patient repeat questions/statements in the same day?: No  Does the patient have trouble remembering the date, year, and time?: No  Does the patient have difficulty managing finances?: No  Does the patient have a decreased sense of direction?: No  A "Yes" response to any of the above questions could indicate mild cognitive impairment and should trigger further investigation.: n  Assessment/Plan:     1. Wellness examination  Lab work reviewed with patient  Continue current medication  Continue " diet/exercise  Advanced directive discussed with patient  Return to clinic with any concerns    2. Thoracic aortic ectasia  Followed by Dr Sotelo      Medicare Annual Wellness and Personalized Prevention Plan:   Fall Risk + Home Safety + Hearing Impairment + Depression Screen + Opioid and Substance Abuse Screening + Cognitive Impairment Screen + Health Risk Assessment all reviewed.     Health Maintenance Topics with due status: Not Due       Topic Last Completion Date    Colorectal Cancer Screening 03/22/2017    TETANUS VACCINE 01/10/2021    Lipid Panel 06/01/2022      The patient's Health Maintenance was reviewed and the following appears to be due at this time:   Health Maintenance Due   Topic Date Due    COVID-19 Vaccine (1) Never done    Shingles Vaccine (1 of 2) Never done    Pneumococcal Vaccines (Age 65+) (2 - PCV) 04/26/2022    Influenza Vaccine (1) 09/01/2022       Advance Care Planning   I attest to discussing Advance Care Planning with patient and/or family member.  Education was provided including the importance of the Health Care Power of , Advance Directives, and/or [place] documentation.  The patient expressed understanding to the importance of this information and discussion.         Follow up in about 6 months (around 5/29/2023). In addition to their scheduled follow up, the patient has also been instructed to follow up on as needed basis.

## 2023-01-06 ENCOUNTER — TELEPHONE (OUTPATIENT)
Dept: CARDIAC SURGERY | Facility: CLINIC | Age: 73
End: 2023-01-06
Payer: MEDICARE

## 2023-01-27 DIAGNOSIS — I71.21 ANEURYSM OF ASCENDING AORTA WITHOUT RUPTURE: Primary | ICD-10-CM

## 2023-07-25 ENCOUNTER — HOSPITAL ENCOUNTER (OUTPATIENT)
Dept: RADIOLOGY | Facility: HOSPITAL | Age: 73
Discharge: HOME | End: 2023-07-25
Attending: SPECIALIST
Payer: MEDICARE

## 2023-07-25 DIAGNOSIS — I71.21 ANEURYSM OF ASCENDING AORTA WITHOUT RUPTURE: ICD-10-CM

## 2023-07-25 DIAGNOSIS — I72.9 ANEURYSM: Primary | ICD-10-CM

## 2023-07-25 PROCEDURE — 25500020 PHARM REV CODE 255: Performed by: SPECIALIST

## 2023-07-25 PROCEDURE — 71275 CT ANGIOGRAPHY CHEST: CPT | Mod: TC

## 2023-07-25 RX ADMIN — IOPAMIDOL 100 ML: 755 INJECTION, SOLUTION INTRAVENOUS at 12:07

## 2023-08-03 DIAGNOSIS — I71.21 ANEURYSM OF ASCENDING AORTA WITHOUT RUPTURE: Primary | ICD-10-CM

## 2023-08-10 ENCOUNTER — OFFICE VISIT (OUTPATIENT)
Dept: URGENT CARE | Facility: CLINIC | Age: 73
End: 2023-08-10
Payer: MEDICARE

## 2023-08-10 VITALS
HEIGHT: 71 IN | HEART RATE: 107 BPM | WEIGHT: 200 LBS | SYSTOLIC BLOOD PRESSURE: 112 MMHG | TEMPERATURE: 101 F | DIASTOLIC BLOOD PRESSURE: 70 MMHG | RESPIRATION RATE: 22 BRPM | BODY MASS INDEX: 28 KG/M2 | OXYGEN SATURATION: 95 %

## 2023-08-10 DIAGNOSIS — R50.9 FEVER, UNSPECIFIED FEVER CAUSE: Primary | ICD-10-CM

## 2023-08-10 LAB
CTP QC/QA: YES
CTP QC/QA: YES
POC MOLECULAR INFLUENZA A AGN: NEGATIVE
POC MOLECULAR INFLUENZA B AGN: NEGATIVE
SARS-COV-2 RDRP RESP QL NAA+PROBE: NEGATIVE

## 2023-08-10 PROCEDURE — 99203 OFFICE O/P NEW LOW 30 MIN: CPT | Mod: ,,, | Performed by: PHYSICIAN ASSISTANT

## 2023-08-10 PROCEDURE — 87502 POCT INFLUENZA A/B MOLECULAR: ICD-10-PCS | Mod: QW,,, | Performed by: PHYSICIAN ASSISTANT

## 2023-08-10 PROCEDURE — 87635: ICD-10-PCS | Mod: QW,,, | Performed by: PHYSICIAN ASSISTANT

## 2023-08-10 PROCEDURE — 99203 PR OFFICE/OUTPT VISIT, NEW, LEVL III, 30-44 MIN: ICD-10-PCS | Mod: ,,, | Performed by: PHYSICIAN ASSISTANT

## 2023-08-10 PROCEDURE — 87502 INFLUENZA DNA AMP PROBE: CPT | Mod: QW,,, | Performed by: PHYSICIAN ASSISTANT

## 2023-08-10 PROCEDURE — 87635 SARS-COV-2 COVID-19 AMP PRB: CPT | Mod: QW,,, | Performed by: PHYSICIAN ASSISTANT

## 2023-08-10 RX ORDER — AZITHROMYCIN 250 MG/1
TABLET, FILM COATED ORAL
Qty: 6 TABLET | Refills: 0 | Status: SHIPPED | OUTPATIENT
Start: 2023-08-10 | End: 2023-08-15

## 2023-08-10 RX ORDER — ACETAMINOPHEN 500 MG
1000 TABLET ORAL
Status: COMPLETED | OUTPATIENT
Start: 2023-08-10 | End: 2023-08-10

## 2023-08-10 RX ADMIN — Medication 1000 MG: at 04:08

## 2023-08-10 NOTE — PATIENT INSTRUCTIONS
Negative influenza and negative COVID testing today though high concern for acute viral illness    Recommend alternate Tylenol and low-dose ibuprofen for aches pains fever chills every 6-8 hours.  Encouraged plenty water noncarbonated fluids hydration and rest over the next 5-7 days.  Recommend over-the-counter decongestant antihistamine nasal spray if needed for upper respiratory symptoms.  Recommend repeat home COVID testing in the next 24 hours.  If concern for acute COVID virus may start Molnuprivar within 3-5 days of onset of acute symptoms.  If not improving in the next 5 days fever persist recommend azithromycin antibiotic backup coverage.  Recommend follow-up with primary care physician in the next 5-7 days for re-evaluation if not improving.  Recommended emergency department evaluation sooner if fever worsens

## 2023-08-10 NOTE — PROGRESS NOTES
"Subjective:      Patient ID: Perry Miller is a 72 y.o. male.    Vitals:  height is 5' 11" (1.803 m) and weight is 90.7 kg (200 lb). His temperature is 100.8 °F (38.2 °C) (abnormal). His blood pressure is 112/70 and his pulse is 107. His respiration is 22 (abnormal) and oxygen saturation is 95%.     Chief Complaint: Fever    HPI:  Elderly male who reports in the last 2 days developing acute body aches malaise fatigue and chills difficulty resting overnight.  Patient reports persistent chills and worsening body aches this morning while attending to wife doctor's appointment.  Patient presents to urgent care this afternoon for fever and body aches evaluation reports no known recent viral sick contacts      Constitution: Positive for chills, fatigue, fever and generalized weakness.   HENT:  Negative for ear pain, congestion, sinus pain, sore throat, trouble swallowing and voice change.    Neck: Negative for neck pain and neck stiffness.   Cardiovascular: Negative.    Respiratory:  Negative for cough, shortness of breath and wheezing.    Gastrointestinal:  Negative for abdominal pain, nausea, vomiting, constipation and diarrhea.   Genitourinary:  Negative for dysuria.   Musculoskeletal:  Positive for muscle ache.   Skin: Negative.  Negative for erythema.   Allergic/Immunologic: Negative.    Neurological:  Negative for dizziness, light-headedness and passing out.   Psychiatric/Behavioral:  Positive for sleep disturbance.       Objective:     Physical Exam   Constitutional: He is oriented to person, place, and time. He appears well-developed. He is cooperative.  Non-toxic appearance. He does not appear ill. No distress.      Comments:Awake alert pleasant ambulatory male attended by wife     HENT:   Head: Normocephalic and atraumatic.   Ears:   Right Ear: Hearing, tympanic membrane, external ear and ear canal normal.   Left Ear: Hearing, tympanic membrane, external ear and ear canal normal.   Nose: Nose normal. No mucosal " edema, rhinorrhea, nasal deformity or congestion. No epistaxis. Right sinus exhibits no maxillary sinus tenderness and no frontal sinus tenderness. Left sinus exhibits no maxillary sinus tenderness and no frontal sinus tenderness.   Mouth/Throat: Uvula is midline, oropharynx is clear and moist and mucous membranes are normal. Mucous membranes are moist. No trismus in the jaw. Normal dentition. No uvula swelling. No oropharyngeal exudate, posterior oropharyngeal edema or posterior oropharyngeal erythema.   Eyes: Conjunctivae and lids are normal. No scleral icterus.   Neck: Trachea normal and phonation normal. Neck supple. No edema present. No erythema present. No neck rigidity present.   Cardiovascular: Regular rhythm, normal heart sounds and normal pulses. Tachycardia present.   No murmur heard.Exam reveals no gallop.   Pulmonary/Chest: Effort normal and breath sounds normal. No stridor. No respiratory distress. He has no decreased breath sounds. He has no wheezes. He has no rhonchi. He has no rales.   Abdominal: Normal appearance. He exhibits no distension. flat abdomen There is no abdominal tenderness. There is no rebound, no guarding, no left CVA tenderness and no right CVA tenderness.   Musculoskeletal: Normal range of motion.         General: No swelling, tenderness, deformity or signs of injury. Normal range of motion.      Cervical back: He exhibits no tenderness.   Lymphadenopathy:     He has no cervical adenopathy.   Neurological: no focal deficit. He is alert and oriented to person, place, and time. He displays no weakness. No cranial nerve deficit. He exhibits normal muscle tone. Coordination normal.   Skin: Skin is warm, dry, intact, not diaphoretic, not pale and no rash. No erythema and No lesion   Psychiatric: His speech is normal and behavior is normal. Mood, judgment and thought content normal.   Nursing note and vitals reviewed.         Previous History      Review of patient's allergies  "indicates:  No Known Allergies    Past Medical History:   Diagnosis Date    Hyperlipidemia     Hypertension      Current Outpatient Medications   Medication Instructions    aspirin (ECOTRIN) 81 mg, Oral    azithromycin (Z-SUNNI) 250 MG tablet Take 2 tablets by mouth on day 1; Take 1 tablet by mouth on days 2-5<BR>    diclofenac sodium (VOLTAREN) 2 g, Topical    ENTRESTO 24-26 mg per tablet 0.5 tablets, Oral, 2 times daily    FLUoxetine 20 MG capsule TAKE 1 CAPSULE BY MOUTH DAILY    furosemide (LASIX) 40 MG tablet No dose, route, or frequency recorded.    ipratropium (ATROVENT) 21 mcg (0.03 %) nasal spray 1 spray, Each Nostril, 2 times daily    metoprolol succinate (TOPROL-XL) 25 mg, Oral    molnupiravir 800 mg, Oral, Every 12 hours    multivitamin capsule 1 capsule, Oral, Daily    pravastatin (PRAVACHOL) 40 mg, Oral     Past Surgical History:   Procedure Laterality Date    CORONARY ARTERY BYPASS GRAFT      INNER EAR SURGERY       Family History   Problem Relation Age of Onset    Hypertension Mother     Heart disease Mother     Cancer Father     Diabetes Sister     Hypertension Brother     Heart disease Brother        Social History     Tobacco Use    Smoking status: Never    Smokeless tobacco: Never   Substance Use Topics    Alcohol use: Not Currently        Physical Exam      Vital Signs Reviewed   /70   Pulse 107   Temp (!) 100.8 °F (38.2 °C)   Resp (!) 22   Ht 5' 11" (1.803 m)   Wt 90.7 kg (200 lb)   SpO2 95%   BMI 27.89 kg/m²        Procedures    Procedures     Labs     Results for orders placed or performed in visit on 08/10/23   POCT COVID-19 Rapid Screening   Result Value Ref Range    POC Rapid COVID Negative Negative     Acceptable Yes    POCT Influenza A/B MOLECULAR   Result Value Ref Range    POC Molecular Influenza A Ag Negative Negative, Not Reported    POC Molecular Influenza B Ag Negative Negative, Not Reported     Acceptable Yes        Assessment:     1. Fever, " unspecified fever cause        Plan:   Pt AAOx4 drinking water and tolerates Tylenol in clinic understands negative specific viral testing today though high concern for fever suspected viral and discharge plan with symptomatic Rx counter medication rest and hydration potential repeat testing and follow-up with backup plan evaluation and prescription or recommended emergency department if symptoms worsen.    Negative influenza and negative COVID testing today though high concern for acute viral illness    Recommend alternate Tylenol and low-dose ibuprofen for aches pains fever chills every 6-8 hours.  Encouraged plenty water noncarbonated fluids hydration and rest over the next 5-7 days.  Recommend over-the-counter decongestant antihistamine nasal spray if needed for upper respiratory symptoms.  Recommend repeat home COVID testing in the next 24 hours.  If concern for acute COVID virus may start Molnuprivar within 3-5 days of onset of acute symptoms.  If not improving in the next 5 days fever persist recommend azithromycin antibiotic backup coverage.  Recommend follow-up with primary care physician in the next 5-7 days for re-evaluation if not improving.  Recommended emergency department evaluation sooner if fever worsens    Fever, unspecified fever cause  -     POCT COVID-19 Rapid Screening  -     POCT Influenza A/B MOLECULAR  -     acetaminophen tablet 1,000 mg  -     molnupiravir 200 mg capsule (EUA); Take 4 capsules (800 mg total) by mouth every 12 (twelve) hours. for 5 days  Dispense: 40 capsule; Refill: 0    Other orders  -     Discontinue: molnupiravir 200 mg capsule (EUA); Take 4 capsules (800 mg total) by mouth every 12 (twelve) hours. for 5 days  Dispense: 40 capsule; Refill: 0  -     azithromycin (Z-SUNNI) 250 MG tablet; Take 2 tablets by mouth on day 1; Take 1 tablet by mouth on days 2-5  Dispense: 6 tablet; Refill: 0

## 2023-08-14 ENCOUNTER — TELEPHONE (OUTPATIENT)
Dept: FAMILY MEDICINE | Facility: CLINIC | Age: 73
End: 2023-08-14
Payer: MEDICARE

## 2023-08-14 NOTE — TELEPHONE ENCOUNTER
----- Message from Ronak Rowdy sent at 8/14/2023 10:54 AM CDT -----  .Type:  Sooner Apoointment Request    Caller is requesting a sooner appointment.  Caller declined first available appointment listed below.  Caller will not accept being placed on the waitlist and is requesting a message be sent to doctor.  Name of Caller:Genie Reyna  When is the first available appointment?08/28/23  Symptoms: would not state  Would the patient rather a call back or a response via [facility]? Call back  Best Call Back Number:[phone]  Additional Information:

## 2023-08-15 ENCOUNTER — LAB VISIT (OUTPATIENT)
Dept: LAB | Facility: HOSPITAL | Age: 73
End: 2023-08-15
Attending: FAMILY MEDICINE
Payer: MEDICARE

## 2023-08-15 ENCOUNTER — OFFICE VISIT (OUTPATIENT)
Dept: FAMILY MEDICINE | Facility: CLINIC | Age: 73
End: 2023-08-15
Payer: MEDICARE

## 2023-08-15 VITALS
HEART RATE: 81 BPM | BODY MASS INDEX: 27.16 KG/M2 | RESPIRATION RATE: 18 BRPM | DIASTOLIC BLOOD PRESSURE: 62 MMHG | WEIGHT: 194 LBS | OXYGEN SATURATION: 98 % | TEMPERATURE: 98 F | HEIGHT: 71 IN | SYSTOLIC BLOOD PRESSURE: 122 MMHG

## 2023-08-15 DIAGNOSIS — I71.10 RUPTURED ANEURYSM OF THORACIC AORTA, UNSPECIFIED PART: ICD-10-CM

## 2023-08-15 DIAGNOSIS — I50.22 CHRONIC SYSTOLIC (CONGESTIVE) HEART FAILURE: ICD-10-CM

## 2023-08-15 DIAGNOSIS — R35.89 POLYURIA: ICD-10-CM

## 2023-08-15 DIAGNOSIS — R30.0 DYSURIA: Primary | ICD-10-CM

## 2023-08-15 DIAGNOSIS — G47.00 INSOMNIA, UNSPECIFIED TYPE: ICD-10-CM

## 2023-08-15 LAB
ALBUMIN SERPL-MCNC: 3.7 G/DL (ref 3.4–4.8)
ALBUMIN/GLOB SERPL: 0.9 RATIO (ref 1.1–2)
ALP SERPL-CCNC: 72 UNIT/L (ref 40–150)
ALT SERPL-CCNC: 18 UNIT/L (ref 0–55)
AST SERPL-CCNC: 26 UNIT/L (ref 5–34)
BASOPHILS # BLD AUTO: 0.02 X10(3)/MCL
BASOPHILS NFR BLD AUTO: 0.3 %
BILIRUB SERPL-MCNC: 0.5 MG/DL
BILIRUB SERPL-MCNC: NORMAL MG/DL
BLOOD URINE, POC: 5
BUN SERPL-MCNC: 16 MG/DL (ref 8.4–25.7)
CALCIUM SERPL-MCNC: 9.9 MG/DL (ref 8.8–10)
CHLORIDE SERPL-SCNC: 105 MMOL/L (ref 98–107)
CLARITY, POC UA: CLEAR
CO2 SERPL-SCNC: 30 MMOL/L (ref 23–31)
COLOR, POC UA: YELLOW
CREAT SERPL-MCNC: 0.83 MG/DL (ref 0.73–1.18)
EOSINOPHIL # BLD AUTO: 0.1 X10(3)/MCL (ref 0–0.9)
EOSINOPHIL NFR BLD AUTO: 1.7 %
ERYTHROCYTE [DISTWIDTH] IN BLOOD BY AUTOMATED COUNT: 12.1 % (ref 11.5–17)
GFR SERPLBLD CREATININE-BSD FMLA CKD-EPI: >60 MLS/MIN/1.73/M2
GLOBULIN SER-MCNC: 4.1 GM/DL (ref 2.4–3.5)
GLUCOSE SERPL-MCNC: 103 MG/DL (ref 82–115)
GLUCOSE UR QL STRIP: NORMAL
HCT VFR BLD AUTO: 37.8 % (ref 42–52)
HGB BLD-MCNC: 12.7 G/DL (ref 14–18)
IMM GRANULOCYTES # BLD AUTO: 0.02 X10(3)/MCL (ref 0–0.04)
IMM GRANULOCYTES NFR BLD AUTO: 0.3 %
KETONES UR QL STRIP: NORMAL
LEUKOCYTE ESTERASE URINE, POC: NORMAL
LYMPHOCYTES # BLD AUTO: 1.37 X10(3)/MCL (ref 0.6–4.6)
LYMPHOCYTES NFR BLD AUTO: 22.8 %
MCH RBC QN AUTO: 33.1 PG (ref 27–31)
MCHC RBC AUTO-ENTMCNC: 33.6 G/DL (ref 33–36)
MCV RBC AUTO: 98.4 FL (ref 80–94)
MONOCYTES # BLD AUTO: 0.82 X10(3)/MCL (ref 0.1–1.3)
MONOCYTES NFR BLD AUTO: 13.7 %
NEUTROPHILS # BLD AUTO: 3.67 X10(3)/MCL (ref 2.1–9.2)
NEUTROPHILS NFR BLD AUTO: 61.2 %
NITRITE, POC UA: NORMAL
NRBC BLD AUTO-RTO: 0 %
PH, POC UA: 6
PLATELET # BLD AUTO: 252 X10(3)/MCL (ref 130–400)
PMV BLD AUTO: 10.1 FL (ref 7.4–10.4)
POTASSIUM SERPL-SCNC: 5.1 MMOL/L (ref 3.5–5.1)
PROT SERPL-MCNC: 7.8 GM/DL (ref 5.8–7.6)
PROTEIN, POC: NORMAL
RBC # BLD AUTO: 3.84 X10(6)/MCL (ref 4.7–6.1)
SODIUM SERPL-SCNC: 142 MMOL/L (ref 136–145)
SPECIFIC GRAVITY, POC UA: 1.02
UROBILINOGEN, POC UA: NORMAL
WBC # SPEC AUTO: 6 X10(3)/MCL (ref 4.5–11.5)

## 2023-08-15 PROCEDURE — 81002 POCT URINE DIPSTICK WITHOUT MICROSCOPE: ICD-10-PCS | Mod: ,,, | Performed by: FAMILY MEDICINE

## 2023-08-15 PROCEDURE — 99214 PR OFFICE/OUTPT VISIT, EST, LEVL IV, 30-39 MIN: ICD-10-PCS | Mod: ,,, | Performed by: FAMILY MEDICINE

## 2023-08-15 PROCEDURE — 80053 COMPREHEN METABOLIC PANEL: CPT

## 2023-08-15 PROCEDURE — 99214 OFFICE O/P EST MOD 30 MIN: CPT | Mod: ,,, | Performed by: FAMILY MEDICINE

## 2023-08-15 PROCEDURE — 81002 URINALYSIS NONAUTO W/O SCOPE: CPT | Mod: ,,, | Performed by: FAMILY MEDICINE

## 2023-08-15 PROCEDURE — 85025 COMPLETE CBC W/AUTO DIFF WBC: CPT

## 2023-08-15 PROCEDURE — 36415 COLL VENOUS BLD VENIPUNCTURE: CPT

## 2023-08-15 RX ORDER — TRAZODONE HYDROCHLORIDE 50 MG/1
50 TABLET ORAL NIGHTLY
Qty: 30 TABLET | Refills: 1 | Status: SHIPPED | OUTPATIENT
Start: 2023-08-15 | End: 2023-12-05

## 2023-08-15 NOTE — PROGRESS NOTES
"Subjective:       Patient ID: Perry Miller is a 72 y.o. male.    Chief Complaint: running fever and fatigue (S/P Recent straining caused new onset right shoulder pain, fever, body aches, negative covid. ), Urinary Frequency, and Insomnia      HPI    Review of Systems   Constitutional:  Negative for chills and fever.   Respiratory: Negative.     Cardiovascular: Negative.    Endocrine: Positive for polyuria. Negative for polydipsia.   Genitourinary:  Positive for frequency.   Musculoskeletal:         Right shoulder pain: pain past lifting someone, worse when reaching overhead   Psychiatric/Behavioral:  Positive for sleep disturbance (having trouble falling/staying asleep).            Objective:      /62 (BP Location: Right arm, Patient Position: Sitting, BP Method: Large (Manual))   Pulse 81   Temp 98.4 °F (36.9 °C)   Resp 18   Ht 5' 11" (1.803 m)   Wt 88 kg (194 lb)   SpO2 98%   BMI 27.06 kg/m²    Physical Exam  Constitutional:       Appearance: Normal appearance.   Cardiovascular:      Rate and Rhythm: Normal rate and regular rhythm.   Pulmonary:      Effort: Pulmonary effort is normal.      Breath sounds: Normal breath sounds.   Neurological:      Mental Status: He is alert.   Psychiatric:         Mood and Affect: Mood normal.         Behavior: Behavior normal.         Thought Content: Thought content normal.         Judgment: Judgment normal.             Recent Results (from the past 504 hour(s))   Creatinine Serum, ASAP    Collection Time: 07/25/23 11:29 AM   Result Value Ref Range    Creatinine 0.92 0.73 - 1.18 mg/dL    eGFR >60 mls/min/1.73/m2   POCT Influenza A/B MOLECULAR    Collection Time: 08/10/23  4:47 PM   Result Value Ref Range    POC Molecular Influenza A Ag Negative Negative, Not Reported    POC Molecular Influenza B Ag Negative Negative, Not Reported     Acceptable Yes    POCT COVID-19 Rapid Screening    Collection Time: 08/10/23  4:47 PM   Result Value Ref Range    POC " Rapid COVID Negative Negative     Acceptable Yes    POCT URINE DIPSTICK WITHOUT MICROSCOPE    Collection Time: 08/15/23  9:23 AM   Result Value Ref Range    Color, UA Yellow     pH, UA 6.0     WBC, UA neg     Nitrite, UA neg     Protein, POC neg     Glucose, UA neg     Ketones, UA neg     Urobilinogen, UA normal     Bilirubin, POC neg     Blood, UA 5     Clarity, UA Clear     Spec Grav UA 1.020         Assessment:       Problem List Items Addressed This Visit          Cardiac/Vascular    Chronic systolic (congestive) heart failure    Current Assessment & Plan     Followed by Dr. Orosco   Continue current medication         Ruptured aneurysm of thoracic aorta, unspecified part    Current Assessment & Plan     Repaired by Dr. Simental discussed   Followed by Cardiology          Other Visit Diagnoses       Dysuria    -  Primary    Relevant Orders    POCT URINE DIPSTICK WITHOUT MICROSCOPE (Completed)    Insomnia, unspecified type        Relevant Medications    traZODone (DESYREL) 50 MG tablet    Polyuria        Relevant Orders    Comprehensive Metabolic Panel    CBC Auto Differential               Plan:   1. Dysuria  -     POCT URINE DIPSTICK WITHOUT MICROSCOPE  UA results reviewed with patient   Continue increase fluid intake   Monitor  Return to clinic with any concerns     2. Insomnia, unspecified type  -     traZODone (DESYREL) 50 MG tablet; Take 1 tablet (50 mg total) by mouth every evening.  Dispense: 30 tablet; Refill: 1  Start trazodone 50 mg q.h.s.  Sleep hygiene  Monitor  Return to clinic with any concerns     3. Chronic systolic (congestive) heart failure  Assessment & Plan:  Followed by Dr. Orosco   Continue current medication    4. Ruptured aneurysm of thoracic aorta, unspecified part  Assessment & Plan:  Repaired by Dr. Simental discussed   Followed by Cardiology    5. Polyuria  -     Comprehensive Metabolic Panel; Future; Expected date: 08/15/2023  -     CBC Auto Differential; Future; Expected date:  08/15/2023  Check lab work

## 2023-10-30 ENCOUNTER — PATIENT MESSAGE (OUTPATIENT)
Dept: ADMINISTRATIVE | Facility: OTHER | Age: 73
End: 2023-10-30
Payer: MEDICARE

## 2023-11-16 DIAGNOSIS — E78.5 HYPERLIPIDEMIA, UNSPECIFIED HYPERLIPIDEMIA TYPE: ICD-10-CM

## 2023-11-16 DIAGNOSIS — Z11.4 ENCOUNTER FOR HIV (HUMAN IMMUNODEFICIENCY VIRUS) TEST: Primary | ICD-10-CM

## 2023-11-16 DIAGNOSIS — Z12.5 SCREENING FOR MALIGNANT NEOPLASM OF PROSTATE: ICD-10-CM

## 2023-11-16 DIAGNOSIS — Z00.00 WELLNESS EXAMINATION: ICD-10-CM

## 2023-11-16 DIAGNOSIS — Z13.6 SCREENING FOR ISCHEMIC HEART DISEASE: ICD-10-CM

## 2023-11-16 DIAGNOSIS — Z11.59 NEED FOR HEPATITIS C SCREENING TEST: ICD-10-CM

## 2023-11-30 ENCOUNTER — LAB VISIT (OUTPATIENT)
Dept: LAB | Facility: HOSPITAL | Age: 73
End: 2023-11-30
Attending: FAMILY MEDICINE
Payer: MEDICARE

## 2023-11-30 DIAGNOSIS — Z11.59 NEED FOR HEPATITIS C SCREENING TEST: ICD-10-CM

## 2023-11-30 DIAGNOSIS — Z11.4 ENCOUNTER FOR HIV (HUMAN IMMUNODEFICIENCY VIRUS) TEST: ICD-10-CM

## 2023-11-30 DIAGNOSIS — Z12.5 SCREENING FOR MALIGNANT NEOPLASM OF PROSTATE: ICD-10-CM

## 2023-11-30 DIAGNOSIS — E78.5 HYPERLIPIDEMIA, UNSPECIFIED HYPERLIPIDEMIA TYPE: ICD-10-CM

## 2023-11-30 DIAGNOSIS — Z00.00 WELLNESS EXAMINATION: ICD-10-CM

## 2023-11-30 DIAGNOSIS — Z13.6 SCREENING FOR ISCHEMIC HEART DISEASE: ICD-10-CM

## 2023-11-30 LAB
CHOLEST SERPL-MCNC: 151 MG/DL
CHOLEST/HDLC SERPL: 3 {RATIO} (ref 0–5)
HCV AB SERPL QL IA: NONREACTIVE
HDLC SERPL-MCNC: 45 MG/DL (ref 35–60)
HIV 1+2 AB+HIV1 P24 AG SERPL QL IA: NONREACTIVE
LDLC SERPL CALC-MCNC: 88 MG/DL (ref 50–140)
PSA SERPL-MCNC: 4.45 NG/ML
TRIGL SERPL-MCNC: 92 MG/DL (ref 34–140)
VLDLC SERPL CALC-MCNC: 18 MG/DL

## 2023-11-30 PROCEDURE — 80061 LIPID PANEL: CPT

## 2023-11-30 PROCEDURE — 86803 HEPATITIS C AB TEST: CPT

## 2023-11-30 PROCEDURE — 87389 HIV-1 AG W/HIV-1&-2 AB AG IA: CPT

## 2023-11-30 PROCEDURE — 84153 ASSAY OF PSA TOTAL: CPT

## 2023-11-30 PROCEDURE — 36415 COLL VENOUS BLD VENIPUNCTURE: CPT

## 2023-11-30 NOTE — PROGRESS NOTES
Please inform patient of results.     1. Please refer patient to urology for elevated PSA.  We will discuss rest of lab work at office visit

## 2023-12-01 ENCOUNTER — TELEPHONE (OUTPATIENT)
Dept: FAMILY MEDICINE | Facility: CLINIC | Age: 73
End: 2023-12-01
Payer: MEDICARE

## 2023-12-01 DIAGNOSIS — R97.20 ELEVATED PSA: Primary | ICD-10-CM

## 2023-12-04 DIAGNOSIS — Z23 NEED FOR VACCINATION: Primary | ICD-10-CM

## 2023-12-05 ENCOUNTER — OFFICE VISIT (OUTPATIENT)
Dept: FAMILY MEDICINE | Facility: CLINIC | Age: 73
End: 2023-12-05
Payer: MEDICARE

## 2023-12-05 VITALS
DIASTOLIC BLOOD PRESSURE: 64 MMHG | SYSTOLIC BLOOD PRESSURE: 108 MMHG | WEIGHT: 194 LBS | HEIGHT: 71 IN | HEART RATE: 64 BPM | TEMPERATURE: 97 F | OXYGEN SATURATION: 98 % | BODY MASS INDEX: 27.16 KG/M2 | RESPIRATION RATE: 18 BRPM

## 2023-12-05 DIAGNOSIS — R97.20 ELEVATED PSA: ICD-10-CM

## 2023-12-05 DIAGNOSIS — Z00.00 WELLNESS EXAMINATION: Primary | ICD-10-CM

## 2023-12-05 PROCEDURE — G0439 PR MEDICARE ANNUAL WELLNESS SUBSEQUENT VISIT: ICD-10-PCS | Mod: ,,, | Performed by: FAMILY MEDICINE

## 2023-12-05 PROCEDURE — G0439 PPPS, SUBSEQ VISIT: HCPCS | Mod: ,,, | Performed by: FAMILY MEDICINE

## 2023-12-05 NOTE — PROGRESS NOTES
Patient ID: [id number]     Chief Complaint: wellness, skin lesion left abd      HPI:     Perry Miller is a 73 y.o. male here today for a Medicare Wellness.       Opioid Screening: Patient medication list reviewed, patient is not taking prescription opioids. Patient is not using additional opioids than prescribed. Patient is not at low risk of substance abuse based on this opioid use history.       ----------------------------  Hyperlipidemia  Hypertension     Past Surgical History:   Procedure Laterality Date    CORONARY ARTERY BYPASS GRAFT      INNER EAR SURGERY         Review of patient's allergies indicates:  No Known Allergies    Outpatient Medications Marked as Taking for the 12/5/23 encounter (Office Visit) with Stu Blakely MD   Medication Sig Dispense Refill    aspirin (ECOTRIN) 81 MG EC tablet Take 81 mg by mouth.      ENTRESTO 24-26 mg per tablet Take 0.5 tablets by mouth 2 (two) times daily.      furosemide (LASIX) 40 MG tablet       metoprolol succinate (TOPROL-XL) 25 MG 24 hr tablet Take 25 mg by mouth.      pravastatin (PRAVACHOL) 40 MG tablet Take 40 mg by mouth.      traZODone (DESYREL) 50 MG tablet Take 1 tablet (50 mg total) by mouth every evening. 30 tablet 1       Social History     Socioeconomic History    Marital status:    Tobacco Use    Smoking status: Never    Smokeless tobacco: Never   Substance and Sexual Activity    Alcohol use: Not Currently     Social Determinants of Health     Financial Resource Strain: Low Risk  (8/15/2023)    Overall Financial Resource Strain (CARDIA)     Difficulty of Paying Living Expenses: Not hard at all   Food Insecurity: No Food Insecurity (8/15/2023)    Hunger Vital Sign     Worried About Running Out of Food in the Last Year: Never true     Ran Out of Food in the Last Year: Never true   Transportation Needs: No Transportation Needs (8/15/2023)    PRAPARE - Transportation     Lack of Transportation (Medical): No     Lack of Transportation  (Non-Medical): No   Physical Activity: Insufficiently Active (8/15/2023)    Exercise Vital Sign     Days of Exercise per Week: 3 days     Minutes of Exercise per Session: 30 min   Stress: Stress Concern Present (8/15/2023)    [employer] [place] [employer] - Occupational Stress Questionnaire     Feeling of Stress : Rather much   Social Connections: Socially Isolated (8/15/2023)    Social Connection and Isolation Panel [NHANES]     Frequency of Communication with Friends and Family: Once a week     Frequency of Social Gatherings with Friends and Family: Never     Attends Orthodox Services: Never     Active Member of Clubs or Organizations: No     Attends Club or Organization Meetings: Never     Marital Status:    Housing Stability: Unknown (8/15/2023)    Housing Stability Vital Sign     Unable to Pay for Housing in the Last Year: No     Unstable Housing in the Last Year: No        Family History   Problem Relation Age of Onset    Hypertension Mother     Heart disease Mother     Cancer Father     Diabetes Sister     Hypertension Brother     Heart disease Brother         Patient Care Team:  Stu Blakely MD as PCP - General (Family Medicine)  Stu Blakely MD May, Wade B, MD as Consulting Physician (Cardiology)  Shane Sotelo IV, MD as Consulting Physician (Cardiothoracic Surgery)       Subjective:     Review of Systems   Constitutional: Negative.    Respiratory: Negative.     Cardiovascular: Negative.    Gastrointestinal: Negative.    Psychiatric/Behavioral: Negative.           Patient Reported Health Risk Assessment  What [age] 70-79  Are you male or female?: Male  During the past four weeks, how much have you been bothered by emotional problems such as feeling anxious, depressed, irritable, sad, or downhearted and blue?: Not at all  During the past five weeks, has your physical and/or emotional health limited your social activities with family, friends, neighbors, or groups?:  Not at all  During the past four weeks, how much bodily pain have you generally had?: No pain  During the past four weeks, was someone available to help if you needed and wanted help?: Yes, a little  During the past four weeks, what was the hardest physical activity you could do for at least two minutes?: Moderate  Can you get to places out of walking distance without help?  (For example, can you travel alone on buses or taxis, or drive your own car?): Yes  Can you go shopping for groceries or clothes without someone's help?: Yes  Can you prepare your own meals?: Yes  Can you do your own housework without help?: Yes  Because of any health problems, do you need the help of another person with your personal care needs such as eating, bathing, dressing, or getting around the house?: No  Can you handle your own money without help?: Yes  During the past four weeks, how would you rate your health in general?: Good  How have things been going for you during the past four weeks?: Good and bad parts about equal  Are you having difficulties driving your car?: No  Do you always fasten your seat belt when you are in a car?: Yes, usually  How often in the past four weeks have you been bothered by falling or dizzy when standing up?: Never  How often in the past four weeks have you been bothered by sexual problems?: Never  How often in the past four weeks have you been bothered by trouble eating well?: Never  How often in the past four weeks have you been bothered by teeth or denture problems?: Never  How often in the past four weeks have you been bothered with problems using the telephone?: Never  How often in the past four weeks have you been bothered by tiredness or fatigue?: Never  Have you fallen two or more times in the past year?: No  Are you afraid of falling?: No  Are you a smoker?: No  During the past four weeks, how many drinks of wine, beer, or other alcoholic beverages did you have?: No alcohol at all  Do you exercise  "for about 20 minutes three or more days a week?: Yes, most of the time  Have you been given any information to help you with hazards in your house that might hurt you?: Yes  Have you been given any information to help you with keeping track of your medications?: Yes  How often do you have trouble taking medicines the way you've been told to take them?: I always take them as prescribed  How confident are you that you can control and manage most of your health problems?: Very confident  What is your race? (Check all that apply.):     Objective:     /64 (BP Location: Left arm, Patient Position: Sitting, BP Method: Large (Manual))   Pulse 64   Temp 97.1 °F (36.2 °C)   Resp 18   Ht 5' 11" (1.803 m)   Wt 88 kg (194 lb)   SpO2 98%   BMI 27.06 kg/m²     Physical Exam  Constitutional:       Appearance: Normal appearance.   Cardiovascular:      Rate and Rhythm: Normal rate and regular rhythm.   Pulmonary:      Effort: Pulmonary effort is normal.      Breath sounds: Normal breath sounds.   Abdominal:      General: Abdomen is flat. Bowel sounds are normal.      Palpations: Abdomen is soft.   Neurological:      Mental Status: He is alert.   Psychiatric:         Mood and Affect: Mood normal.         Behavior: Behavior normal.         Thought Content: Thought content normal.         Judgment: Judgment normal.       Recent Results (from the past 504 hour(s))   Hepatitis C Antibody    Collection Time: 11/30/23  9:14 AM   Result Value Ref Range    Hep C Ab Interp Nonreactive Nonreactive   HIV 1/2 Ag/Ab (4th Gen)    Collection Time: 11/30/23  9:14 AM   Result Value Ref Range    HIV Nonreactive Nonreactive   Lipid Panel    Collection Time: 11/30/23  9:14 AM   Result Value Ref Range    Cholesterol Total 151 <=200 mg/dL    HDL Cholesterol 45 35 - 60 mg/dL    Triglyceride 92 34 - 140 mg/dL    Cholesterol/HDL Ratio 3 0 - 5    Very Low Density Lipoprotein 18     LDL Cholesterol 88.00 50.00 - 140.00 mg/dL   PSA, " "Screening    Collection Time: 11/30/23  9:14 AM   Result Value Ref Range    Prostate Specific Antigen 4.45 (H) <=4.00 ng/mL               No data to display                  12/5/2023     2:30 PM 8/15/2023     8:45 AM 11/29/2022     1:30 PM   Fall Risk Assessment - Outpatient   Mobility Status Ambulatory Ambulatory Ambulatory   Number of falls 0 0 0   Identified as fall risk False False False           Depression Screening  Over the past two weeks, has the patient felt down, depressed, or hopeless?: No  Over the past two weeks, has the patient felt little interest or pleasure in doing things?: No  Functional Ability/Safety Screening  Was the patient's timed Up & Go test unsteady or longer than 30 seconds?: No  Does the patient need help with phone, transportation, shopping, preparing meals, housework, laundry, meds, or managing money?: No  Does the patient's home have rugs in the hallway, lack grab bars in the bathroom, lack handrails on the stairs or have poor lighting?: No  Have you noticed any hearing difficulties?: No  A "Yes" response to any of the above questions should trigger further investigation.: n  Cognitive Function (Assessed through direct observation with due consideration of information obtained by way of patient reports and/or concerns raised by family, friends, caretakers, or others)    Does the patient repeat questions/statements in the same day?: No  Does the patient have trouble remembering the date, year, and time?: No  Does the patient have difficulty managing finances?: No  Does the patient have a decreased sense of direction?: No  A "Yes" response to any of the above questions could indicate mild cognitive impairment and should trigger further investigation.: n  Assessment/Plan:     1. Wellness examination  Lab work reviewed with patient  Continue current medication  Continue diet/exercise  Advanced directive discussed with patient  Return to clinic with any concerns    Advance Care Planning "     Date: 12/05/2023    Living Will  During this visit, I engaged the patient  in the voluntary advance care planning process.  The patient and I reviewed the role for advance directives and their purpose in directing future healthcare if the patient's unable to speak for him/herself.  At this point in time, the patient does have full decision-making capacity.  We discussed different extreme health states that he could experience, and reviewed what kind of medical care he would want in those situations.  The patient communicated that if he were comatose and had little chance of a meaningful recovery, he would not want machines/life-sustaining treatments used.   I spent a total of 5 minutes engaging the patient in this advance care planning discussion.     2. Elevated PSA  -     Ambulatory referral/consult to Urology; Future; Expected date: 12/12/2023  Refer patient to Dr. Pichardo         Medicare Annual Wellness and Personalized Prevention Plan:   Fall Risk + Home Safety + Hearing Impairment + Depression Screen + Opioid and Substance Abuse Screening + Cognitive Impairment Screen + Health Risk Assessment all reviewed.     Health Maintenance Topics with due status: Not Due       Topic Last Completion Date    Colorectal Cancer Screening 03/22/2017    TETANUS VACCINE 01/10/2021    Lipid Panel 11/30/2023      The patient's Health Maintenance was reviewed and the following appears to be due at this time:   Health Maintenance Due   Topic Date Due    COVID-19 Vaccine (1) Never done    Shingles Vaccine (1 of 2) Never done    RSV Vaccine (Age 60+ and Pregnant patients) (1 - 1-dose 60+ series) Never done    Pneumococcal Vaccines (Age 65+) (2 - PCV) 04/26/2022    Influenza Vaccine (1) 09/01/2023       Advance Care Planning   I attest to discussing Advance Care Planning with patient and/or family member.  Education was provided including the importance of the Health Care Power of , Advance Directives, and/or [place]  documentation.  The patient expressed understanding to the importance of this information and discussion.         Follow up in about 6 months (around 6/5/2024). In addition to their scheduled follow up, the patient has also been instructed to follow up on as needed basis.

## 2024-03-01 ENCOUNTER — LAB VISIT (OUTPATIENT)
Dept: LAB | Facility: HOSPITAL | Age: 74
End: 2024-03-01
Attending: INTERNAL MEDICINE
Payer: MEDICARE

## 2024-03-01 DIAGNOSIS — I10 HYPERTENSION, UNSPECIFIED TYPE: Primary | ICD-10-CM

## 2024-03-01 DIAGNOSIS — I51.9 HEART DISEASE, UNSPECIFIED: ICD-10-CM

## 2024-03-01 DIAGNOSIS — Z01.810 PRE-OPERATIVE CARDIOVASCULAR EXAMINATION: ICD-10-CM

## 2024-03-01 DIAGNOSIS — I48.0 PAROXYSMAL ATRIAL FIBRILLATION: ICD-10-CM

## 2024-03-01 LAB
ANION GAP SERPL CALC-SCNC: 8 MEQ/L
BUN SERPL-MCNC: 15 MG/DL (ref 8.4–25.7)
CALCIUM SERPL-MCNC: 9.8 MG/DL (ref 8.8–10)
CHLORIDE SERPL-SCNC: 106 MMOL/L (ref 98–107)
CO2 SERPL-SCNC: 27 MMOL/L (ref 23–31)
CREAT SERPL-MCNC: 0.93 MG/DL (ref 0.73–1.18)
CREAT/UREA NIT SERPL: 16
GFR SERPLBLD CREATININE-BSD FMLA CKD-EPI: >60 MLS/MIN/1.73/M2
GLUCOSE SERPL-MCNC: 93 MG/DL (ref 82–115)
POTASSIUM SERPL-SCNC: 4.4 MMOL/L (ref 3.5–5.1)
SODIUM SERPL-SCNC: 141 MMOL/L (ref 136–145)

## 2024-03-01 PROCEDURE — 80048 BASIC METABOLIC PNL TOTAL CA: CPT

## 2024-03-01 PROCEDURE — 36415 COLL VENOUS BLD VENIPUNCTURE: CPT

## 2024-03-26 ENCOUNTER — LAB VISIT (OUTPATIENT)
Dept: LAB | Facility: HOSPITAL | Age: 74
End: 2024-03-26
Attending: UROLOGY
Payer: MEDICARE

## 2024-03-26 DIAGNOSIS — R97.20 ELEVATED PROSTATE SPECIFIC ANTIGEN (PSA): Primary | ICD-10-CM

## 2024-03-26 LAB — PSA SERPL-MCNC: 3.76 NG/ML

## 2024-03-26 PROCEDURE — 36415 COLL VENOUS BLD VENIPUNCTURE: CPT

## 2024-03-26 PROCEDURE — 84153 ASSAY OF PSA TOTAL: CPT

## 2024-06-20 ENCOUNTER — OFFICE VISIT (OUTPATIENT)
Dept: FAMILY MEDICINE | Facility: CLINIC | Age: 74
End: 2024-06-20
Payer: MEDICARE

## 2024-06-20 VITALS
HEIGHT: 71 IN | DIASTOLIC BLOOD PRESSURE: 62 MMHG | TEMPERATURE: 97 F | WEIGHT: 195 LBS | OXYGEN SATURATION: 98 % | HEART RATE: 93 BPM | BODY MASS INDEX: 27.3 KG/M2 | SYSTOLIC BLOOD PRESSURE: 118 MMHG

## 2024-06-20 DIAGNOSIS — I50.22 CHRONIC SYSTOLIC (CONGESTIVE) HEART FAILURE: ICD-10-CM

## 2024-06-20 DIAGNOSIS — I10 ESSENTIAL HYPERTENSION: Primary | ICD-10-CM

## 2024-06-20 DIAGNOSIS — I71.10 RUPTURED ANEURYSM OF THORACIC AORTA, UNSPECIFIED PART: ICD-10-CM

## 2024-06-20 DIAGNOSIS — E78.5 HYPERLIPIDEMIA, UNSPECIFIED HYPERLIPIDEMIA TYPE: ICD-10-CM

## 2024-06-20 PROCEDURE — 99214 OFFICE O/P EST MOD 30 MIN: CPT | Mod: ,,, | Performed by: FAMILY MEDICINE

## 2024-10-16 ENCOUNTER — HOSPITAL ENCOUNTER (OUTPATIENT)
Dept: RADIOLOGY | Facility: HOSPITAL | Age: 74
Discharge: HOME | End: 2024-10-16
Payer: MEDICARE

## 2024-10-16 ENCOUNTER — OFFICE VISIT (OUTPATIENT)
Dept: FAMILY MEDICINE | Facility: CLINIC | Age: 74
End: 2024-10-16
Payer: MEDICARE

## 2024-10-16 VITALS
BODY MASS INDEX: 26.79 KG/M2 | SYSTOLIC BLOOD PRESSURE: 118 MMHG | DIASTOLIC BLOOD PRESSURE: 62 MMHG | TEMPERATURE: 97 F | WEIGHT: 191.38 LBS | OXYGEN SATURATION: 100 % | HEART RATE: 72 BPM | RESPIRATION RATE: 18 BRPM | HEIGHT: 71 IN

## 2024-10-16 DIAGNOSIS — Z23 NEEDS FLU SHOT: ICD-10-CM

## 2024-10-16 DIAGNOSIS — M77.01 MEDIAL EPICONDYLITIS OF ELBOW, RIGHT: Primary | ICD-10-CM

## 2024-10-16 DIAGNOSIS — M77.01 MEDIAL EPICONDYLITIS OF ELBOW, RIGHT: ICD-10-CM

## 2024-10-16 PROCEDURE — 73080 X-RAY EXAM OF ELBOW: CPT | Mod: TC,RT

## 2024-10-16 RX ORDER — NAPROXEN 500 MG/1
500 TABLET ORAL 2 TIMES DAILY WITH MEALS
Qty: 20 TABLET | Refills: 0 | Status: SHIPPED | OUTPATIENT
Start: 2024-10-16 | End: 2024-10-26

## 2024-10-16 NOTE — PROGRESS NOTES
"  Family Medicine      Patient ID: [id number]     Chief Complaint: Pain (Right arm x one month. )      HPI:     Perry Miller is a 73 y.o. male here today for right medial elbow pain x1 month. Pain is described as intermittent, sharp, and made worse with finger flexion, mainly gripping.  Denies associated hand, wrist, or forearm pain.  No swelling or redness to site of pain. Reports mild TTP over right medial epicondyle. Denies inciting injury/trauma. 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 Mild relief with essential oils and Voltaren gel.  Requesting right elbow x-ray for further evaluation.    Past Medical History:   Diagnosis Date    Hyperlipidemia     Hypertension         Past Surgical History:   Procedure Laterality Date    CORONARY ARTERY BYPASS GRAFT      INNER EAR SURGERY          Review of patient's allergies indicates:  No Known Allergies     Patient Care Team:  Stu Blakely MD as PCP - General (Family Medicine)  Stu Blakely MD May, Wade B, MD as Consulting Physician (Cardiology)  Shane Sotelo IV, MD as Consulting Physician (Cardiothoracic Surgery)  Killian Pichardo MD as Consulting Physician (Urology)     Subjective:     Review of Systems   Constitutional: Negative.    Respiratory: Negative.     Cardiovascular: Negative.    Gastrointestinal: Negative.    Musculoskeletal:  Positive for arthralgias. Negative for joint swelling, myalgias, neck pain and neck stiffness.        Right medial elbow pain   Skin: Negative.    Neurological: Negative.    Psychiatric/Behavioral: Negative.       Objective:     Visit Vitals  /62 (BP Location: Left arm, Patient Position: Sitting)   Pulse 72   Temp 97 °F (36.1 °C) (Temporal)   Resp 18   Ht 5' 11" (1.803 m)   Wt 86.8 kg (191 lb 6.4 oz)   SpO2 100%   BMI 26.69 kg/m²       Physical Exam  Constitutional:       General: He is not in acute distress.    "  Appearance: Normal appearance. He is not ill-appearing.   Cardiovascular:      Rate and Rhythm: Normal rate and regular rhythm.      Heart sounds: Normal heart sounds.   Pulmonary:      Effort: Pulmonary effort is normal. No respiratory distress.      Breath sounds: Normal breath sounds. No wheezing, rhonchi or rales.   Abdominal:      General: Bowel sounds are normal. There is no distension.      Palpations: Abdomen is soft.      Tenderness: There is no abdominal tenderness.   Musculoskeletal:      Right elbow: No swelling, deformity, effusion or lacerations. Normal range of motion. Tenderness present in medial epicondyle.      Cervical back: Neck supple.      Comments: TTP over medial epicondyle noted. No associated edema or erythema to area. No deformity or effusion noted. Full ROM.  Skin integrity intact.   Skin:     General: Skin is warm and dry.   Neurological:      General: No focal deficit present.      Mental Status: He is alert.   Psychiatric:         Mood and Affect: Mood normal.         Behavior: Behavior normal.         Thought Content: Thought content normal.         Judgment: Judgment normal.       Assessment:       ICD-10-CM ICD-9-CM   1. Medial epicondylitis of elbow, right  M77.01 726.31   2. Needs flu shot  Z23 V04.81      Plan:     1. Medial epicondylitis of elbow, right  Assessment & Plan:  Order for right elbow x-ray to be completed  Start naproxen 500 mg by mouth b.i.d. x10 days p.r.n. pain/inflammation  Recommend continued use of Voltaren gel p.r.n. pain/inflammation  Recommend wearing compression sleeve  Apply ice to affected area 3-4x/day for 20 minutes at a time  Supplement naproxen with OTC Tylenol as needed for pain  Activity modification and avoidance of overuse or activities that exacerbate pain  Return to clinic with any concerns.    Orders:  -     naproxen (NAPROSYN) 500 MG tablet; Take 1 tablet (500 mg total) by mouth 2 (two) times daily with meals. for 10 days  Dispense: 20  tablet; Refill: 0  -     X-Ray Elbow Complete Right; Future; Expected date: 10/16/2024    2. Needs flu shot  Assessment & Plan:  Annual influenza vaccine given IM x1 in clinic    Orders:  -     influenza (adjuvanted) (Fluad) 45 mcg/0.5 mL IM vaccine (> or = 64 yo) 0.5 mL         Follow up if symptoms worsen or fail to improve. In addition to their scheduled follow up, the patient has also been instructed to follow up on as needed basis.     Adelia Wayne, NP

## 2024-10-16 NOTE — ASSESSMENT & PLAN NOTE
Order for right elbow x-ray to be completed  Start naproxen 500 mg by mouth b.i.d. x10 days p.r.n. pain/inflammation  Recommend continued use of Voltaren gel p.r.n. pain/inflammation  Recommend wearing compression sleeve  Apply ice to affected area 3-4x/day for 20 minutes at a time  Supplement naproxen with OTC Tylenol as needed for pain  Activity modification and avoidance of overuse or activities that exacerbate pain  Return to clinic with any concerns.

## 2024-10-17 ENCOUNTER — TELEPHONE (OUTPATIENT)
Dept: FAMILY MEDICINE | Facility: CLINIC | Age: 74
End: 2024-10-17
Payer: MEDICARE

## 2024-10-17 NOTE — PROGRESS NOTES
Please inform patient of results.    1. Right elbow x-ray showed degenerative changes, no acute fracture or other acute abnormality present.  Continue prescribed anti-inflammatory, wear compression sleeve, ice area as discussed, and continue to monitor.  Call with any concerns.

## 2024-11-19 DIAGNOSIS — E78.5 HYPERLIPIDEMIA, UNSPECIFIED HYPERLIPIDEMIA TYPE: ICD-10-CM

## 2024-11-19 DIAGNOSIS — Z11.59 NEED FOR HEPATITIS C SCREENING TEST: ICD-10-CM

## 2024-11-19 DIAGNOSIS — Z00.00 WELLNESS EXAMINATION: ICD-10-CM

## 2024-11-19 DIAGNOSIS — Z11.4 ENCOUNTER FOR HIV (HUMAN IMMUNODEFICIENCY VIRUS) TEST: ICD-10-CM

## 2024-11-19 DIAGNOSIS — R97.20 ELEVATED PSA: ICD-10-CM

## 2024-11-19 DIAGNOSIS — I10 ESSENTIAL HYPERTENSION: Primary | ICD-10-CM

## 2024-12-12 ENCOUNTER — LAB VISIT (OUTPATIENT)
Dept: LAB | Facility: HOSPITAL | Age: 74
End: 2024-12-12
Attending: FAMILY MEDICINE
Payer: MEDICARE

## 2024-12-12 ENCOUNTER — TELEPHONE (OUTPATIENT)
Dept: CARDIAC SURGERY | Facility: CLINIC | Age: 74
End: 2024-12-12
Payer: MEDICARE

## 2024-12-12 DIAGNOSIS — Z11.59 NEED FOR HEPATITIS C SCREENING TEST: ICD-10-CM

## 2024-12-12 DIAGNOSIS — Z00.00 WELLNESS EXAMINATION: ICD-10-CM

## 2024-12-12 DIAGNOSIS — I10 ESSENTIAL HYPERTENSION: ICD-10-CM

## 2024-12-12 DIAGNOSIS — Z11.4 ENCOUNTER FOR HIV (HUMAN IMMUNODEFICIENCY VIRUS) TEST: ICD-10-CM

## 2024-12-12 DIAGNOSIS — E78.5 HYPERLIPIDEMIA, UNSPECIFIED HYPERLIPIDEMIA TYPE: ICD-10-CM

## 2024-12-12 LAB
ALBUMIN SERPL-MCNC: 4.3 G/DL (ref 3.4–4.8)
ALBUMIN/GLOB SERPL: 1.4 RATIO (ref 1.1–2)
ALP SERPL-CCNC: 83 UNIT/L (ref 40–150)
ALT SERPL-CCNC: 14 UNIT/L (ref 0–55)
ANION GAP SERPL CALC-SCNC: 6 MEQ/L
AST SERPL-CCNC: 20 UNIT/L (ref 5–34)
BASOPHILS # BLD AUTO: 0.01 X10(3)/MCL
BASOPHILS NFR BLD AUTO: 0.2 %
BILIRUB SERPL-MCNC: 0.6 MG/DL
BUN SERPL-MCNC: 16 MG/DL (ref 8.4–25.7)
CALCIUM SERPL-MCNC: 10.2 MG/DL (ref 8.8–10)
CHLORIDE SERPL-SCNC: 105 MMOL/L (ref 98–107)
CHOLEST SERPL-MCNC: 145 MG/DL
CHOLEST/HDLC SERPL: 3 {RATIO} (ref 0–5)
CO2 SERPL-SCNC: 30 MMOL/L (ref 23–31)
CREAT SERPL-MCNC: 0.97 MG/DL (ref 0.72–1.25)
CREAT/UREA NIT SERPL: 16
EOSINOPHIL # BLD AUTO: 0.07 X10(3)/MCL (ref 0–0.9)
EOSINOPHIL NFR BLD AUTO: 1.6 %
ERYTHROCYTE [DISTWIDTH] IN BLOOD BY AUTOMATED COUNT: 11.8 % (ref 11.5–17)
GFR SERPLBLD CREATININE-BSD FMLA CKD-EPI: >60 ML/MIN/1.73/M2
GLOBULIN SER-MCNC: 3.1 GM/DL (ref 2.4–3.5)
GLUCOSE SERPL-MCNC: 104 MG/DL (ref 82–115)
HCT VFR BLD AUTO: 41.2 % (ref 42–52)
HCV AB SERPL QL IA: NONREACTIVE
HDLC SERPL-MCNC: 44 MG/DL (ref 35–60)
HGB BLD-MCNC: 14.3 G/DL (ref 14–18)
HIV 1+2 AB+HIV1 P24 AG SERPL QL IA: NONREACTIVE
IMM GRANULOCYTES # BLD AUTO: 0 X10(3)/MCL (ref 0–0.04)
IMM GRANULOCYTES NFR BLD AUTO: 0 %
LDLC SERPL CALC-MCNC: 84 MG/DL (ref 50–140)
LYMPHOCYTES # BLD AUTO: 1.76 X10(3)/MCL (ref 0.6–4.6)
LYMPHOCYTES NFR BLD AUTO: 39.5 %
MCH RBC QN AUTO: 33.5 PG (ref 27–31)
MCHC RBC AUTO-ENTMCNC: 34.7 G/DL (ref 33–36)
MCV RBC AUTO: 96.5 FL (ref 80–94)
MONOCYTES # BLD AUTO: 0.53 X10(3)/MCL (ref 0.1–1.3)
MONOCYTES NFR BLD AUTO: 11.9 %
NEUTROPHILS # BLD AUTO: 2.09 X10(3)/MCL (ref 2.1–9.2)
NEUTROPHILS NFR BLD AUTO: 46.8 %
NRBC BLD AUTO-RTO: 0 %
PLATELET # BLD AUTO: 185 X10(3)/MCL (ref 130–400)
PMV BLD AUTO: 10.9 FL (ref 7.4–10.4)
POTASSIUM SERPL-SCNC: 4.6 MMOL/L (ref 3.5–5.1)
PROT SERPL-MCNC: 7.4 GM/DL (ref 5.8–7.6)
RBC # BLD AUTO: 4.27 X10(6)/MCL (ref 4.7–6.1)
SODIUM SERPL-SCNC: 141 MMOL/L (ref 136–145)
TRIGL SERPL-MCNC: 84 MG/DL (ref 34–140)
VLDLC SERPL CALC-MCNC: 17 MG/DL
WBC # BLD AUTO: 4.46 X10(3)/MCL (ref 4.5–11.5)

## 2024-12-12 PROCEDURE — 86803 HEPATITIS C AB TEST: CPT

## 2024-12-12 PROCEDURE — 80061 LIPID PANEL: CPT

## 2024-12-12 PROCEDURE — 80053 COMPREHEN METABOLIC PANEL: CPT

## 2024-12-12 PROCEDURE — 87389 HIV-1 AG W/HIV-1&-2 AB AG IA: CPT

## 2024-12-12 PROCEDURE — 85025 COMPLETE CBC W/AUTO DIFF WBC: CPT

## 2024-12-12 PROCEDURE — 36415 COLL VENOUS BLD VENIPUNCTURE: CPT

## 2024-12-17 ENCOUNTER — OFFICE VISIT (OUTPATIENT)
Dept: FAMILY MEDICINE | Facility: CLINIC | Age: 74
End: 2024-12-17
Payer: MEDICARE

## 2024-12-17 VITALS
OXYGEN SATURATION: 99 % | WEIGHT: 189 LBS | DIASTOLIC BLOOD PRESSURE: 64 MMHG | TEMPERATURE: 97 F | BODY MASS INDEX: 26.46 KG/M2 | RESPIRATION RATE: 18 BRPM | HEART RATE: 84 BPM | HEIGHT: 71 IN | SYSTOLIC BLOOD PRESSURE: 108 MMHG

## 2024-12-17 DIAGNOSIS — Z00.00 WELLNESS EXAMINATION: Primary | ICD-10-CM

## 2024-12-17 PROCEDURE — G0439 PPPS, SUBSEQ VISIT: HCPCS | Mod: ,,, | Performed by: FAMILY MEDICINE

## 2024-12-17 RX ORDER — SPIRONOLACTONE 25 MG/1
25 TABLET ORAL DAILY
COMMUNITY
Start: 2024-11-21

## 2024-12-17 RX ORDER — FUROSEMIDE 20 MG/1
20 TABLET ORAL
COMMUNITY
Start: 2024-12-09

## 2024-12-17 NOTE — PROGRESS NOTES
Patient ID: [id number]     Chief Complaint: No chief complaint on file.      HPI:     Perry Miller is a 74 y.o. male here today for a Medicare Wellness.       Opioid Screening: Patient medication list reviewed, patient is not taking prescription opioids. Patient is not using additional opioids than prescribed. Patient is not at low risk of substance abuse based on this opioid use history.       -------------------------------------    Hyperlipidemia    Hypertension        Past Surgical History:   Procedure Laterality Date    CARDIAC VALVE REPLACEMENT  4/29/2019    CORONARY ARTERY BYPASS GRAFT      INNER EAR SURGERY      TONSILLECTOMY  1973       Review of patient's allergies indicates:  No Known Allergies    Outpatient Medications Marked as Taking for the 12/17/24 encounter (Office Visit) with Stu Blakely MD   Medication Sig Dispense Refill    aspirin (ECOTRIN) 81 MG EC tablet Take 81 mg by mouth once daily.      ENTRESTO 24-26 mg per tablet Take 0.5 tablets by mouth 2 (two) times daily.      furosemide (LASIX) 20 MG tablet Take 20 mg by mouth.      metoprolol succinate (TOPROL-XL) 25 MG 24 hr tablet Take 25 mg by mouth.      pravastatin (PRAVACHOL) 40 MG tablet Take 40 mg by mouth.      spironolactone (ALDACTONE) 25 MG tablet Take 25 mg by mouth once daily.         Social History     Socioeconomic History    Marital status:    Tobacco Use    Smoking status: Never    Smokeless tobacco: Never   Substance and Sexual Activity    Alcohol use: Not Currently    Drug use: Never    Sexual activity: Yes     Partners: Female     Birth control/protection: None     Social Drivers of Health     Financial Resource Strain: Low Risk  (8/15/2023)    Overall Financial Resource Strain (CARDIA)     Difficulty of Paying Living Expenses: Not hard at all   Food Insecurity: No Food Insecurity (8/15/2023)    Hunger Vital Sign     Worried About Running Out of Food in the Last Year: Never true     Ran Out of Food in the Last  "Year: Never true   Transportation Needs: No Transportation Needs (8/15/2023)    PRAPARE - Transportation     Lack of Transportation (Medical): No     Lack of Transportation (Non-Medical): No   Physical Activity: Insufficiently Active (8/15/2023)    Exercise Vital Sign     Days of Exercise per Week: 3 days     Minutes of Exercise per Session: 30 min   Stress: Stress Concern Present (8/15/2023)    Citizen of [place] [employer] - Occupational Stress Questionnaire     Feeling of Stress : Rather much   Housing Stability: Unknown (8/15/2023)    Housing Stability Vital Sign     Unable to Pay for Housing in the Last Year: No     Unstable Housing in the Last Year: No        Family History   Problem Relation Name Age of Onset    Hypertension Mother Keiko Miller     Heart disease Mother Keiko Miller     Cancer Father Jian Miller     Diabetes Sister Migdalia Tijerina     Hypertension Brother Deep Miller     Heart disease Brother Deep Miller         Patient Care Team:  Stu Blakely MD as PCP - General (Family Medicine)  Stu Blakely MD May, Wade B, MD as Consulting Physician (Cardiology)  Shane Sotelo IV, MD as Consulting Physician (Cardiothoracic Surgery)  Killian Pichardo MD as Consulting Physician (Urology)       Subjective:     Review of Systems   Constitutional: Negative.    Respiratory: Negative.     Cardiovascular: Negative.    Gastrointestinal: Negative.    Psychiatric/Behavioral: Negative.           Patient Reported Health Risk Assessment       Objective:     /64   Pulse 84   Temp 97 °F (36.1 °C) (Temporal)   Resp 18   Ht 5' 10.87" (1.8 m)   Wt 85.7 kg (189 lb)   SpO2 99%   BMI 26.46 kg/m²     Physical Exam  Constitutional:       Appearance: Normal appearance.   Cardiovascular:      Rate and Rhythm: Normal rate and regular rhythm.   Pulmonary:      Effort: Pulmonary effort is normal.      Breath sounds: Normal breath sounds.   Abdominal:      General: Abdomen is flat. " Bowel sounds are normal.      Palpations: Abdomen is soft.   Neurological:      Mental Status: He is alert.   Psychiatric:         Mood and Affect: Mood normal.         Behavior: Behavior normal.         Thought Content: Thought content normal.         Judgment: Judgment normal.       Recent Results (from the past 3 weeks)   Comprehensive Metabolic Panel    Collection Time: 12/12/24  9:06 AM   Result Value Ref Range    Sodium 141 136 - 145 mmol/L    Potassium 4.6 3.5 - 5.1 mmol/L    Chloride 105 98 - 107 mmol/L    CO2 30 23 - 31 mmol/L    Glucose 104 82 - 115 mg/dL    Blood Urea Nitrogen 16.0 8.4 - 25.7 mg/dL    Creatinine 0.97 0.72 - 1.25 mg/dL    Calcium 10.2 (H) 8.8 - 10.0 mg/dL    Protein Total 7.4 5.8 - 7.6 gm/dL    Albumin 4.3 3.4 - 4.8 g/dL    Globulin 3.1 2.4 - 3.5 gm/dL    Albumin/Globulin Ratio 1.4 1.1 - 2.0 ratio    Bilirubin Total 0.6 <=1.5 mg/dL    ALP 83 40 - 150 unit/L    ALT 14 0 - 55 unit/L    AST 20 5 - 34 unit/L    eGFR >60 mL/min/1.73/m2    Anion Gap 6.0 mEq/L    BUN/Creatinine Ratio 16    Hepatitis C Antibody    Collection Time: 12/12/24  9:06 AM   Result Value Ref Range    Hep C Ab Interp Nonreactive Nonreactive   HIV 1/2 Ag/Ab (4th Gen)    Collection Time: 12/12/24  9:06 AM   Result Value Ref Range    HIV Nonreactive Nonreactive   Lipid Panel    Collection Time: 12/12/24  9:06 AM   Result Value Ref Range    Cholesterol Total 145 <=200 mg/dL    HDL Cholesterol 44 35 - 60 mg/dL    Triglyceride 84 34 - 140 mg/dL    Cholesterol/HDL Ratio 3 0 - 5    Very Low Density Lipoprotein 17     LDL Cholesterol 84.00 50.00 - 140.00 mg/dL   CBC with Differential    Collection Time: 12/12/24  9:06 AM   Result Value Ref Range    WBC 4.46 (L) 4.50 - 11.50 x10(3)/mcL    RBC 4.27 (L) 4.70 - 6.10 x10(6)/mcL    Hgb 14.3 14.0 - 18.0 g/dL    Hct 41.2 (L) 42.0 - 52.0 %    MCV 96.5 (H) 80.0 - 94.0 fL    MCH 33.5 (H) 27.0 - 31.0 pg    MCHC 34.7 33.0 - 36.0 g/dL    RDW 11.8 11.5 - 17.0 %    Platelet 185 130 - 400  x10(3)/mcL    MPV 10.9 (H) 7.4 - 10.4 fL    Neut % 46.8 %    Lymph % 39.5 %    Mono % 11.9 %    Eos % 1.6 %    Basophil % 0.2 %    Lymph # 1.76 0.6 - 4.6 x10(3)/mcL    Neut # 2.09 (L) 2.1 - 9.2 x10(3)/mcL    Mono # 0.53 0.1 - 1.3 x10(3)/mcL    Eos # 0.07 0 - 0.9 x10(3)/mcL    Baso # 0.01 <=0.2 x10(3)/mcL    IG# 0.00 0 - 0.04 x10(3)/mcL    IG% 0.0 %    NRBC% 0.0 %               No data to display                  10/16/2024     3:00 PM 6/20/2024     1:30 PM 12/5/2023     2:30 PM 8/15/2023     8:45 AM 11/29/2022     1:30 PM   Fall Risk Assessment - Outpatient   Mobility Status Ambulatory Ambulatory Ambulatory Ambulatory Ambulatory   Number of falls 0 0 0 0 0   Identified as fall risk False False False False False              Assessment/Plan:     1. Wellness examination  Assessment & Plan:  Lab work reviewed with patient  Continue current medication  Continue diet/exercise  Advanced directive discussed with patient  Return to clinic with any concerns    Advance Care Planning    Date: 12/17/2024    Living Will  During this visit, I engaged the patient  in the voluntary advance care planning process.  The patient and I reviewed the role for advance directives and their purpose in directing future healthcare if the patient's unable to speak for him/herself.  At this point in time, the patient does have full decision-making capacity.  We discussed different extreme health states that he could experience, and reviewed what kind of medical care he would want in those situations.  The patient communicated that if he were comatose and had little chance of a meaningful recovery, he would not want machines/life-sustaining treatments used.   I spent a total of 5 minutes engaging the patient in this advance care planning discussion.                  Medicare Annual Wellness and Personalized Prevention Plan:   Fall Risk + Home Safety + Hearing Impairment + Depression Screen + Opioid and Substance Abuse Screening + Cognitive  Impairment Screen + Health Risk Assessment all reviewed.     Health Maintenance Topics with due status: Not Due       Topic Last Completion Date    Colorectal Cancer Screening 03/22/2017    TETANUS VACCINE 01/10/2021    Lipid Panel 12/12/2024      The patient's Health Maintenance was reviewed and the following appears to be due at this time:   Health Maintenance Due   Topic Date Due    Shingles Vaccine (1 of 2) Never done    RSV Vaccine (Age 60+ and Pregnant patients) (1 - Risk 60-74 years 1-dose series) Never done    Pneumococcal Vaccines (Age 65+) (2 of 2 - PCV) 04/26/2022    COVID-19 Vaccine (1 - 2024-25 season) Never done       Advance Care Planning   I attest to discussing Advance Care Planning with patient and/or family member.  Education was provided including the importance of the Health Care Power of , Advance Directives, and/or [place] documentation.  The patient expressed understanding to the importance of this information and discussion.         Follow up in about 6 months (around 6/17/2025). In addition to their scheduled follow up, the patient has also been instructed to follow up on as needed basis.

## 2024-12-17 NOTE — ASSESSMENT & PLAN NOTE
Lab work reviewed with patient  Continue current medication  Continue diet/exercise  Advanced directive discussed with patient  Return to clinic with any concerns    Advance Care Planning     Date: 12/17/2024    Living Will  During this visit, I engaged the patient  in the voluntary advance care planning process.  The patient and I reviewed the role for advance directives and their purpose in directing future healthcare if the patient's unable to speak for him/herself.  At this point in time, the patient does have full decision-making capacity.  We discussed different extreme health states that he could experience, and reviewed what kind of medical care he would want in those situations.  The patient communicated that if he were comatose and had little chance of a meaningful recovery, he would not want machines/life-sustaining treatments used.   I spent a total of 5 minutes engaging the patient in this advance care planning discussion.

## 2025-02-07 ENCOUNTER — LAB VISIT (OUTPATIENT)
Dept: LAB | Facility: HOSPITAL | Age: 75
End: 2025-02-07
Attending: INTERNAL MEDICINE
Payer: MEDICARE

## 2025-02-07 DIAGNOSIS — I10 HYPERTENSION, UNSPECIFIED TYPE: Primary | ICD-10-CM

## 2025-02-07 LAB
ANION GAP SERPL CALC-SCNC: 7 MEQ/L
BUN SERPL-MCNC: 15 MG/DL (ref 8.4–25.7)
CALCIUM SERPL-MCNC: 9.7 MG/DL (ref 8.8–10)
CHLORIDE SERPL-SCNC: 105 MMOL/L (ref 98–107)
CO2 SERPL-SCNC: 29 MMOL/L (ref 23–31)
CREAT SERPL-MCNC: 0.86 MG/DL (ref 0.72–1.25)
CREAT/UREA NIT SERPL: 17
GFR SERPLBLD CREATININE-BSD FMLA CKD-EPI: >60 ML/MIN/1.73/M2
GLUCOSE SERPL-MCNC: 118 MG/DL (ref 82–115)
POTASSIUM SERPL-SCNC: 4 MMOL/L (ref 3.5–5.1)
SODIUM SERPL-SCNC: 141 MMOL/L (ref 136–145)

## 2025-02-07 PROCEDURE — 36415 COLL VENOUS BLD VENIPUNCTURE: CPT

## 2025-02-07 PROCEDURE — 80048 BASIC METABOLIC PNL TOTAL CA: CPT

## 2025-02-10 ENCOUNTER — OFFICE VISIT (OUTPATIENT)
Dept: FAMILY MEDICINE | Facility: CLINIC | Age: 75
End: 2025-02-10
Payer: MEDICARE

## 2025-02-10 VITALS
HEIGHT: 71 IN | DIASTOLIC BLOOD PRESSURE: 62 MMHG | RESPIRATION RATE: 18 BRPM | HEART RATE: 67 BPM | WEIGHT: 197 LBS | SYSTOLIC BLOOD PRESSURE: 90 MMHG | OXYGEN SATURATION: 98 % | TEMPERATURE: 97 F | BODY MASS INDEX: 27.58 KG/M2

## 2025-02-10 DIAGNOSIS — H61.21 HEARING LOSS OF RIGHT EAR DUE TO CERUMEN IMPACTION: ICD-10-CM

## 2025-02-10 DIAGNOSIS — H72.91 PERFORATION OF RIGHT TYMPANIC MEMBRANE: Primary | ICD-10-CM

## 2025-02-10 DIAGNOSIS — I48.91 ATRIAL FIBRILLATION, UNSPECIFIED TYPE: ICD-10-CM

## 2025-02-10 DIAGNOSIS — I71.10 RUPTURED ANEURYSM OF THORACIC AORTA, UNSPECIFIED PART: ICD-10-CM

## 2025-02-10 DIAGNOSIS — I50.22 CHRONIC SYSTOLIC (CONGESTIVE) HEART FAILURE: ICD-10-CM

## 2025-02-10 NOTE — PROGRESS NOTES
"Subjective:     Patient ID: Perry Miller is a 74 y.o. male.    Chief Complaint: Pre-op Exam (Surgery Clearance with Dr Perez for ear tubes)        History of Present Illness    CHIEF COMPLAINT:  Patient presents today for ear tube removal.    EAR HISTORY:  He reports right ear fullness for over 9 months. A recent attempt at earwax removal was unsuccessful due to pain.      ROS:  ENT: +ear pain        Review of Systems   Constitutional: Negative.    HENT:          Patient scheduled for right tympanic membrane repair with Dr. Perez   Respiratory: Negative.     Cardiovascular: Negative.            Objective:     BP 90/62   Pulse 67   Temp 97.1 °F (36.2 °C) (Temporal)   Resp 18   Ht 5' 10.87" (1.8 m)   Wt 89.4 kg (197 lb)   SpO2 98%   BMI 27.58 kg/m²    Physical Exam  Constitutional:       Appearance: Normal appearance.   HENT:      Right Ear: There is impacted cerumen.      Left Ear: Tympanic membrane and ear canal normal.      Ears:        Comments: Right TM perforation  Cardiovascular:      Rate and Rhythm: Normal rate and regular rhythm.      Heart sounds: Normal heart sounds.   Pulmonary:      Effort: Pulmonary effort is normal.      Breath sounds: Normal breath sounds.   Neurological:      Mental Status: He is alert.   Psychiatric:         Mood and Affect: Mood normal.         Behavior: Behavior normal.         Thought Content: Thought content normal.         Judgment: Judgment normal.             Assessment:     Problem List Items Addressed This Visit          Cardiac/Vascular    Atrial fibrillation    Current Assessment & Plan     Followed by Dr. Orosco   Continue daily aspirin         Chronic systolic (congestive) heart failure    Current Assessment & Plan     Followed by Dr. Orosco   Continue Lasix         Ruptured aneurysm of thoracic aorta, unspecified part    Current Assessment & Plan     Followed by Cardiovascular surgery          Other Visit Diagnoses       Perforation of right tympanic membrane  "   -  Primary    Hearing loss of right ear due to cerumen impaction                 Plan:   1. Perforation of right tympanic membrane  Cardiac clearance with Dr. Orosco   Patient cleared medically   Keep scheduled appointment with Dr. Perez    2. Hearing loss of right ear due to cerumen impaction  Cerumen Impaction right  *Procedure Note*  Posterior traction applied on helix  Bionix lighted ear currette placed in ear canal  Cerumen impaction removed along with PE tube that was adherent to cerumen  Ear canal re-examined  Canal normal  Tympanic membrane clear    3. Chronic systolic (congestive) heart failure  Assessment & Plan:  Followed by Dr. Orosco   Continue Lasix    4. Atrial fibrillation, unspecified type  Assessment & Plan:  Followed by Dr. Orosco   Continue daily aspirin    5. Ruptured aneurysm of thoracic aorta, unspecified part  Assessment & Plan:  Followed by Cardiovascular surgery                This note was generated with the assistance of ambient listening technology. Verbal consent was obtained by the patient and accompanying visitor(s) for the recording of patient appointment to facilitate this note. I attest to having reviewed and edited the generated note for accuracy, though some syntax or spelling errors may persist. Please contact the author of this note for any clarification.

## 2025-03-06 ENCOUNTER — HOSPITAL ENCOUNTER (OUTPATIENT)
Dept: RADIOLOGY | Facility: HOSPITAL | Age: 75
Discharge: HOME | End: 2025-03-06
Attending: INTERNAL MEDICINE
Payer: MEDICARE

## 2025-03-06 DIAGNOSIS — I48.0 PAF (PAROXYSMAL ATRIAL FIBRILLATION): ICD-10-CM

## 2025-03-06 DIAGNOSIS — I10 HTN (HYPERTENSION): ICD-10-CM

## 2025-03-06 DIAGNOSIS — I51.9 LEFT VENTRICULAR DYSFUNCTION: ICD-10-CM

## 2025-03-06 PROCEDURE — 25500020 PHARM REV CODE 255: Performed by: INTERNAL MEDICINE

## 2025-03-06 PROCEDURE — 71275 CT ANGIOGRAPHY CHEST: CPT | Mod: TC

## 2025-03-06 RX ORDER — IOPAMIDOL 755 MG/ML
100 INJECTION, SOLUTION INTRAVASCULAR
Status: COMPLETED | OUTPATIENT
Start: 2025-03-06 | End: 2025-03-06

## 2025-03-06 RX ADMIN — IOPAMIDOL 100 ML: 755 INJECTION, SOLUTION INTRAVENOUS at 10:03

## 2025-03-17 ENCOUNTER — ANESTHESIA EVENT (OUTPATIENT)
Dept: SURGERY | Facility: HOSPITAL | Age: 75
End: 2025-03-17
Payer: MEDICARE

## 2025-03-20 ENCOUNTER — ANESTHESIA (OUTPATIENT)
Dept: SURGERY | Facility: HOSPITAL | Age: 75
End: 2025-03-20
Payer: MEDICARE

## 2025-03-20 ENCOUNTER — HOSPITAL ENCOUNTER (OUTPATIENT)
Facility: HOSPITAL | Age: 75
Discharge: HOME | End: 2025-03-20
Attending: OTOLARYNGOLOGY | Admitting: OTOLARYNGOLOGY
Payer: MEDICARE

## 2025-03-20 VITALS
BODY MASS INDEX: 26.88 KG/M2 | SYSTOLIC BLOOD PRESSURE: 101 MMHG | DIASTOLIC BLOOD PRESSURE: 67 MMHG | HEIGHT: 71 IN | OXYGEN SATURATION: 98 % | HEART RATE: 71 BPM | RESPIRATION RATE: 20 BRPM | WEIGHT: 192 LBS | TEMPERATURE: 98 F

## 2025-03-20 DIAGNOSIS — H69.81 OTHER SPECIFIED DISORDERS OF EUSTACHIAN TUBE, RIGHT EAR: Primary | ICD-10-CM

## 2025-03-20 PROCEDURE — 36000705 HC OR TIME LEV I EA ADD 15 MIN: Performed by: OTOLARYNGOLOGY

## 2025-03-20 PROCEDURE — 37000008 HC ANESTHESIA 1ST 15 MINUTES: Performed by: OTOLARYNGOLOGY

## 2025-03-20 PROCEDURE — 27201423 OPTIME MED/SURG SUP & DEVICES STERILE SUPPLY: Performed by: OTOLARYNGOLOGY

## 2025-03-20 PROCEDURE — 71000015 HC POSTOP RECOV 1ST HR: Performed by: OTOLARYNGOLOGY

## 2025-03-20 PROCEDURE — A4216 STERILE WATER/SALINE, 10 ML: HCPCS | Performed by: OTOLARYNGOLOGY

## 2025-03-20 PROCEDURE — 63600175 PHARM REV CODE 636 W HCPCS: Performed by: OTOLARYNGOLOGY

## 2025-03-20 PROCEDURE — 36000704 HC OR TIME LEV I 1ST 15 MIN: Performed by: OTOLARYNGOLOGY

## 2025-03-20 PROCEDURE — 37000009 HC ANESTHESIA EA ADD 15 MINS: Performed by: OTOLARYNGOLOGY

## 2025-03-20 PROCEDURE — 25000003 PHARM REV CODE 250: Performed by: OTOLARYNGOLOGY

## 2025-03-20 PROCEDURE — 63600175 PHARM REV CODE 636 W HCPCS: Performed by: NURSE ANESTHETIST, CERTIFIED REGISTERED

## 2025-03-20 DEVICE — ARMSTRONG BEVELED VENT TUBE GROMMET TYPE 1.14 MM I.D. FLUOROPLASTIC (6/PK)
Type: IMPLANTABLE DEVICE | Site: EAR | Status: FUNCTIONAL
Brand: GYRUS ACMI

## 2025-03-20 DEVICE — DISK AMNIOBAND MEMBRANE 10MM: Type: IMPLANTABLE DEVICE | Site: EAR | Status: FUNCTIONAL

## 2025-03-20 RX ORDER — MORPHINE SULFATE 2 MG/ML
2 INJECTION, SOLUTION INTRAMUSCULAR; INTRAVENOUS EVERY 10 MIN PRN
Status: DISCONTINUED | OUTPATIENT
Start: 2025-03-20 | End: 2025-03-20 | Stop reason: HOSPADM

## 2025-03-20 RX ORDER — SODIUM CHLORIDE 0.9 % (FLUSH) 0.9 %
10 SYRINGE (ML) INJECTION
Status: DISCONTINUED | OUTPATIENT
Start: 2025-03-20 | End: 2025-03-20 | Stop reason: HOSPADM

## 2025-03-20 RX ORDER — LABETALOL HYDROCHLORIDE 5 MG/ML
10 INJECTION, SOLUTION INTRAVENOUS ONCE
Status: DISCONTINUED | OUTPATIENT
Start: 2025-03-20 | End: 2025-03-20 | Stop reason: HOSPADM

## 2025-03-20 RX ORDER — PROPOFOL 10 MG/ML
VIAL (ML) INTRAVENOUS
Status: DISCONTINUED | OUTPATIENT
Start: 2025-03-20 | End: 2025-03-20

## 2025-03-20 RX ORDER — HYDRALAZINE HYDROCHLORIDE 20 MG/ML
10 INJECTION INTRAMUSCULAR; INTRAVENOUS EVERY 6 HOURS PRN
Status: DISCONTINUED | OUTPATIENT
Start: 2025-03-20 | End: 2025-03-20 | Stop reason: HOSPADM

## 2025-03-20 RX ORDER — LABETALOL HYDROCHLORIDE 5 MG/ML
5 INJECTION, SOLUTION INTRAVENOUS ONCE
Status: DISCONTINUED | OUTPATIENT
Start: 2025-03-20 | End: 2025-03-20 | Stop reason: HOSPADM

## 2025-03-20 RX ORDER — CEFAZOLIN SODIUM 1 G/3ML
2 INJECTION, POWDER, FOR SOLUTION INTRAMUSCULAR; INTRAVENOUS
Status: DISCONTINUED | OUTPATIENT
Start: 2025-03-20 | End: 2025-03-20 | Stop reason: HOSPADM

## 2025-03-20 RX ORDER — MEPERIDINE HYDROCHLORIDE 25 MG/ML
75 INJECTION INTRAMUSCULAR; INTRAVENOUS; SUBCUTANEOUS EVERY 4 HOURS PRN
Status: DISCONTINUED | OUTPATIENT
Start: 2025-03-20 | End: 2025-03-20 | Stop reason: HOSPADM

## 2025-03-20 RX ORDER — MEPERIDINE HYDROCHLORIDE 25 MG/ML
50 INJECTION INTRAMUSCULAR; INTRAVENOUS; SUBCUTANEOUS EVERY 4 HOURS PRN
Status: DISCONTINUED | OUTPATIENT
Start: 2025-03-20 | End: 2025-03-20 | Stop reason: HOSPADM

## 2025-03-20 RX ORDER — CEFDINIR 300 MG/1
300 CAPSULE ORAL 2 TIMES DAILY
COMMUNITY

## 2025-03-20 RX ORDER — HYDROCODONE BITARTRATE AND ACETAMINOPHEN 7.5; 325 MG/1; MG/1
1 TABLET ORAL EVERY 4 HOURS PRN
Status: DISCONTINUED | OUTPATIENT
Start: 2025-03-20 | End: 2025-03-20 | Stop reason: HOSPADM

## 2025-03-20 RX ORDER — LIDOCAINE HYDROCHLORIDE 10 MG/ML
INJECTION, SOLUTION EPIDURAL; INFILTRATION; INTRACAUDAL; PERINEURAL
Status: DISCONTINUED | OUTPATIENT
Start: 2025-03-20 | End: 2025-03-20

## 2025-03-20 RX ORDER — ONDANSETRON HYDROCHLORIDE 2 MG/ML
4 INJECTION, SOLUTION INTRAVENOUS EVERY 4 HOURS PRN
Status: DISCONTINUED | OUTPATIENT
Start: 2025-03-20 | End: 2025-03-20 | Stop reason: HOSPADM

## 2025-03-20 RX ADMIN — SODIUM CHLORIDE, PRESERVATIVE FREE 10 ML: 5 INJECTION INTRAVENOUS at 08:03

## 2025-03-20 RX ADMIN — PROPOFOL 50 MG: 10 INJECTION, EMULSION INTRAVENOUS at 08:03

## 2025-03-20 RX ADMIN — LIDOCAINE HYDROCHLORIDE 20 MG: 10 INJECTION, SOLUTION EPIDURAL; INFILTRATION; INTRACAUDAL; PERINEURAL at 08:03

## 2025-03-20 RX ADMIN — CEFAZOLIN 2 G: 330 INJECTION, POWDER, FOR SOLUTION INTRAMUSCULAR; INTRAVENOUS at 08:03

## 2025-03-20 NOTE — ANESTHESIA POSTPROCEDURE EVALUATION
Anesthesia Post Evaluation    Patient: Perry Miller    Procedure(s) Performed: Procedure(s) (LRB):  MYRINGOTOMY, WITH TYMPANOSTOMY TUBE REMOVAL (Right)  REPAIR, TYMPANIC MEMBRANE, USING PATCH (Right)    Final Anesthesia Type: MAC      Patient location during evaluation: floor  Patient participation: Yes- Able to Participate  Level of consciousness: awake and alert  Post-procedure vital signs: reviewed and stable  Pain management: adequate  Airway patency: patent    PONV status at discharge: No PONV  Anesthetic complications: no      Cardiovascular status: blood pressure returned to baseline  Respiratory status: unassisted  Hydration status: euvolemic  Follow-up not needed.              Vitals Value Taken Time   BP 93/73 03/20/25 08:55   Temp 36 03/20/25 08:58   Pulse 69 03/20/25 08:58   Resp 18 03/20/25 08:58   SpO2 97 % 03/20/25 08:58   Vitals shown include unfiled device data.      No case tracking events are documented in the log.      Pain/Tyra Score: No data recorded

## 2025-03-20 NOTE — DISCHARGE INSTRUCTIONS
Do not pop ears, do not blow your nose.    Be cautious when bending down     Do not drive or operate any heavy machinery for 24 hours    Resume diet and medications, as tolerated

## 2025-03-20 NOTE — ANESTHESIA PREPROCEDURE EVALUATION
03/20/2025  Perry Miller is a 74 y.o., male.Presenting for a scheduled MYRINGOTOMY, WITH TYMPANOSTOMY TUBE REMOVAL       Pre-op Assessment    I have reviewed the Patient Summary Reports.     I have reviewed the Nursing Notes. I have reviewed the NPO Status.   I have reviewed the Medications.     Review of Systems  Anesthesia Hx:  No problems with previous Anesthesia             Denies Family Hx of Anesthesia complications.    Denies Personal Hx of Anesthesia complications.                    Social:  Non-Smoker       Cardiovascular:     Hypertension    Dysrhythmias atrial fibrillation  CHF                                   Pulmonary:        Sleep Apnea                Renal/:  Renal/ Normal                 Hepatic/GI:  Hepatic/GI Normal                    Musculoskeletal:  Musculoskeletal Normal                Neurological:  Neurology Normal                                      Endocrine:  Endocrine Normal            Psych:   anxiety                 Physical Exam  General: Well nourished, Cooperative, Alert and Oriented    Airway:  Mallampati: II / II  Mouth Opening: Normal  TM Distance: Normal  Tongue: Normal  Neck ROM: Normal ROM    Dental:  Intact    Chest/Lungs:  Normal Respiratory Rate    Heart:  Rate: Normal    Musculoskeletal:  Normal mobility      Anesthesia Plan  Type of Anesthesia, risks & benefits discussed:    Anesthesia Type: MAC  Intra-op Monitoring Plan: Standard ASA Monitors  Post Op Pain Control Plan: multimodal analgesia  Induction:  IV  Informed Consent: Informed consent signed with the Patient and all parties understand the risks and agree with anesthesia plan.  All questions answered.   ASA Score: 3  Day of Surgery Review of History & Physical: H&P Update referred to the surgeon/provider.  Anesthesia Plan Notes: Anesthesia plan was discussed with patient and/or representative. Risks  and alternatives were discussed including the possibility of alteration of plan.     Ready For Surgery From Anesthesia Perspective.     .

## 2025-03-25 NOTE — OP NOTE
[facility] [address] [address]    PATIENT NAME:      SYLVIA WALSH  YOB: 1950  CSN:               [id number]  MRN:               [id number]  ADMIT DATE:        03/20/2025 07:02:00  PHYSICIAN:         Nick Perez MD                          OPERATIVE REPORT      DATE OF SURGERY:    03/20/2025 00:00:00    SURGEON:  Nick Perez MD    PREOPERATIVE DIAGNOSIS:  Retained PE tube.    PROCEDURE PERFORMED:  Right myringoplasty with PE tube removal.    ANESTHESIA:  General.    BLOOD LOSS:  Minimal.    DESCRIPTION OF PROCEDURE:  The patient was brought to the operative suite and   placed in supine position.  Anesthesia administered was general anesthesia with   sedation.  The right ear canal was evaluated under the microscope and the tube   which was placed within the canal was removed and a myringotomy was placed over   the patient's tympanic membrane perforation.  The patient was then returned over   to Anesthesia for extubation.  The patient tolerated the procedure well without   complications.    This patient is status post PE tubes removal. The patient tolerated the procedure well and is discharged home with follow up instructions.     ______________________________  Nick Perez MD    RPC/AQS  DD:  03/24/2025  Time:  12:55PM  DT:  03/24/2025  Time:  07:57PM  Job #:  [id number]      OPERATIVE REPORT

## 2025-07-17 ENCOUNTER — OFFICE VISIT (OUTPATIENT)
Dept: FAMILY MEDICINE | Facility: CLINIC | Age: 75
End: 2025-07-17
Payer: MEDICARE

## 2025-07-17 ENCOUNTER — PATIENT OUTREACH (OUTPATIENT)
Dept: ADMINISTRATIVE | Facility: HOSPITAL | Age: 75
End: 2025-07-17
Payer: MEDICARE

## 2025-07-17 VITALS
HEART RATE: 78 BPM | OXYGEN SATURATION: 98 % | WEIGHT: 189 LBS | RESPIRATION RATE: 16 BRPM | TEMPERATURE: 97 F | BODY MASS INDEX: 26.46 KG/M2 | HEIGHT: 71 IN | SYSTOLIC BLOOD PRESSURE: 100 MMHG | DIASTOLIC BLOOD PRESSURE: 64 MMHG

## 2025-07-17 DIAGNOSIS — I48.91 ATRIAL FIBRILLATION, UNSPECIFIED TYPE: Primary | ICD-10-CM

## 2025-07-17 DIAGNOSIS — I10 ESSENTIAL HYPERTENSION: ICD-10-CM

## 2025-07-17 DIAGNOSIS — E78.5 HYPERLIPIDEMIA, UNSPECIFIED HYPERLIPIDEMIA TYPE: ICD-10-CM

## 2025-07-17 RX ORDER — IPRATROPIUM BROMIDE 42 UG/1
1 SPRAY, METERED NASAL 3 TIMES DAILY
COMMUNITY
Start: 2025-06-23

## 2025-07-17 RX ORDER — TAMSULOSIN HYDROCHLORIDE 0.4 MG/1
1 CAPSULE ORAL DAILY
COMMUNITY
Start: 2025-05-15 | End: 2025-07-17

## 2025-07-17 NOTE — PROGRESS NOTES
"Subjective:     Patient ID: Perry Miller is a 74 y.o. male.    Chief Complaint: 6 month f/u        History of Present Illness    CHIEF COMPLAINT:  Patient presents today for follow up.            Review of Systems   Constitutional: Negative.    Respiratory: Negative.     Cardiovascular: Negative.    Gastrointestinal: Negative.    Psychiatric/Behavioral: Negative.         Objective:     /64   Pulse 78   Temp 97.2 °F (36.2 °C) (Temporal)   Resp 16   Ht 5' 10.87" (1.8 m)   Wt 85.7 kg (189 lb)   SpO2 98%   BMI 26.46 kg/m²    Physical Exam  Constitutional:       Appearance: Normal appearance.   Cardiovascular:      Rate and Rhythm: Normal rate and regular rhythm.      Heart sounds: Normal heart sounds.   Pulmonary:      Effort: Pulmonary effort is normal.      Breath sounds: Normal breath sounds.   Neurological:      Mental Status: He is alert.   Psychiatric:         Mood and Affect: Mood normal.         Behavior: Behavior normal.         Thought Content: Thought content normal.         Judgment: Judgment normal.             Assessment:     Problem List Items Addressed This Visit          Cardiac/Vascular    Essential hypertension    Current Assessment & Plan   Controlled  Continue Entresto 24/26 mg 1/2 tab b.i.d.   Continue Lasix 20 mg daily  Continue spironolactone 25 mg daily  Return to clinic with any concerns         HLD (hyperlipidemia)    Current Assessment & Plan   Continue Pravachol 40 mg q.day   Continue diet modification  FLP with next lab work         Atrial fibrillation - Primary    Current Assessment & Plan   Followed by Dr. Orosco   Continue daily aspirin  Continue Toprol-XL 25 mg daily             Plan:   1. Atrial fibrillation, unspecified type  Assessment & Plan:  Followed by Dr. Orosco   Continue daily aspirin  Continue Toprol-XL 25 mg daily      2. Essential hypertension  Assessment & Plan:  Controlled  Continue Entresto 24/26 mg 1/2 tab b.i.d.   Continue Lasix 20 mg daily  Continue " spironolactone 25 mg daily  Return to clinic with any concerns      3. Hyperlipidemia, unspecified hyperlipidemia type  Assessment & Plan:  Continue Pravachol 40 mg q.day   Continue diet modification  FLP with next lab work                     This note was generated with the assistance of ambient listening technology. Verbal consent was obtained by the patient and accompanying visitor(s) for the recording of patient appointment to facilitate this note. I attest to having reviewed and edited the generated note for accuracy, though some syntax or spelling errors may persist. Please contact the author of this note for any clarification.

## 2025-07-17 NOTE — ASSESSMENT & PLAN NOTE
Controlled  Continue Entresto 24/26 mg 1/2 tab b.i.d.   Continue Lasix 20 mg daily  Continue spironolactone 25 mg daily  Return to clinic with any concerns

## (undated) DEVICE — SEE MEDLINE ITEM 157144

## (undated) DEVICE — BLADE MYR ANG FLAT ARROW JUV

## (undated) DEVICE — SYR 3ML LL 18GA 1.5IN

## (undated) DEVICE — GLOVE SENSICARE PI SURG 6.5

## (undated) DEVICE — TOWEL OR DISP STRL BLUE 4/PK

## (undated) DEVICE — GLOVE SENSICARE PI GRN 7

## (undated) DEVICE — TUBE SUCTION MEDI-VAC STERILE

## (undated) DEVICE — DRAPE HALF SURGICAL 40X58IN

## (undated) DEVICE — SPONGE COTTON TRAY 4X4IN

## (undated) DEVICE — GLOVE SENSICARE PI SURG 7

## (undated) DEVICE — GLOVE PROTEXIS LTX MICRO  7

## (undated) DEVICE — ELECTRODE PATIENT RETURN DISP